# Patient Record
Sex: FEMALE | Race: WHITE | NOT HISPANIC OR LATINO | Employment: STUDENT | URBAN - METROPOLITAN AREA
[De-identification: names, ages, dates, MRNs, and addresses within clinical notes are randomized per-mention and may not be internally consistent; named-entity substitution may affect disease eponyms.]

---
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
## 2023-09-30 ENCOUNTER — HOSPITAL ENCOUNTER (OUTPATIENT)
Facility: CLINIC | Age: 23
Setting detail: OBSERVATION
Discharge: HOME | End: 2023-10-02
Attending: EMERGENCY MEDICINE | Admitting: HOSPITALIST
Payer: COMMERCIAL

## 2023-09-30 ENCOUNTER — APPOINTMENT (OUTPATIENT)
Dept: CT IMAGING | Facility: CLINIC | Age: 23
End: 2023-09-30
Attending: EMERGENCY MEDICINE
Payer: COMMERCIAL

## 2023-09-30 DIAGNOSIS — E11.9 DIABETES MELLITUS, NEW ONSET (H): ICD-10-CM

## 2023-09-30 DIAGNOSIS — R10.84 ABDOMINAL PAIN, GENERALIZED: ICD-10-CM

## 2023-09-30 LAB
ALBUMIN SERPL BCG-MCNC: 4.3 G/DL (ref 3.5–5.2)
ALBUMIN UR-MCNC: NEGATIVE MG/DL
ALP SERPL-CCNC: 79 U/L (ref 35–104)
ALT SERPL W P-5'-P-CCNC: 31 U/L (ref 0–50)
ANION GAP SERPL CALCULATED.3IONS-SCNC: 12 MMOL/L (ref 7–15)
APPEARANCE UR: CLEAR
AST SERPL W P-5'-P-CCNC: 15 U/L (ref 0–45)
B-OH-BUTYR SERPL-SCNC: <0.18 MMOL/L
BASOPHILS # BLD AUTO: 0 10E3/UL (ref 0–0.2)
BASOPHILS NFR BLD AUTO: 0 %
BILIRUB SERPL-MCNC: 0.3 MG/DL
BILIRUB UR QL STRIP: NEGATIVE
BUN SERPL-MCNC: 15.6 MG/DL (ref 6–20)
CALCIUM SERPL-MCNC: 9.6 MG/DL (ref 8.6–10)
CHLORIDE SERPL-SCNC: 101 MMOL/L (ref 98–107)
COLOR UR AUTO: ABNORMAL
CREAT SERPL-MCNC: 0.48 MG/DL (ref 0.51–0.95)
DEPRECATED HCO3 PLAS-SCNC: 21 MMOL/L (ref 22–29)
EGFRCR SERPLBLD CKD-EPI 2021: >90 ML/MIN/1.73M2
EOSINOPHIL # BLD AUTO: 0.1 10E3/UL (ref 0–0.7)
EOSINOPHIL NFR BLD AUTO: 2 %
ERYTHROCYTE [DISTWIDTH] IN BLOOD BY AUTOMATED COUNT: 12.4 % (ref 10–15)
GLUCOSE BLDC GLUCOMTR-MCNC: 259 MG/DL (ref 70–99)
GLUCOSE BLDC GLUCOMTR-MCNC: 276 MG/DL (ref 70–99)
GLUCOSE BLDC GLUCOMTR-MCNC: 282 MG/DL (ref 70–99)
GLUCOSE SERPL-MCNC: 290 MG/DL (ref 70–99)
GLUCOSE UR STRIP-MCNC: >=1000 MG/DL
HBA1C MFR BLD: 10.5 %
HCG UR QL: NEGATIVE
HCT VFR BLD AUTO: 41.3 % (ref 35–47)
HGB BLD-MCNC: 14.2 G/DL (ref 11.7–15.7)
HGB UR QL STRIP: NEGATIVE
IMM GRANULOCYTES # BLD: 0 10E3/UL
IMM GRANULOCYTES NFR BLD: 0 %
KETONES UR STRIP-MCNC: ABNORMAL MG/DL
LEUKOCYTE ESTERASE UR QL STRIP: NEGATIVE
LIPASE SERPL-CCNC: 25 U/L (ref 13–60)
LYMPHOCYTES # BLD AUTO: 3.1 10E3/UL (ref 0.8–5.3)
LYMPHOCYTES NFR BLD AUTO: 38 %
MCH RBC QN AUTO: 27.5 PG (ref 26.5–33)
MCHC RBC AUTO-ENTMCNC: 34.4 G/DL (ref 31.5–36.5)
MCV RBC AUTO: 80 FL (ref 78–100)
MONOCYTES # BLD AUTO: 0.4 10E3/UL (ref 0–1.3)
MONOCYTES NFR BLD AUTO: 5 %
NEUTROPHILS # BLD AUTO: 4.5 10E3/UL (ref 1.6–8.3)
NEUTROPHILS NFR BLD AUTO: 55 %
NITRATE UR QL: NEGATIVE
NRBC # BLD AUTO: 0 10E3/UL
NRBC BLD AUTO-RTO: 0 /100
PH UR STRIP: 5.5 [PH] (ref 5–7)
PLATELET # BLD AUTO: 192 10E3/UL (ref 150–450)
POTASSIUM SERPL-SCNC: 3.9 MMOL/L (ref 3.4–5.3)
PROT SERPL-MCNC: 7 G/DL (ref 6.4–8.3)
RBC # BLD AUTO: 5.16 10E6/UL (ref 3.8–5.2)
SODIUM SERPL-SCNC: 134 MMOL/L (ref 135–145)
SP GR UR STRIP: 1.02 (ref 1–1.03)
UROBILINOGEN UR STRIP-MCNC: NORMAL MG/DL
WBC # BLD AUTO: 8.2 10E3/UL (ref 4–11)

## 2023-09-30 PROCEDURE — 82962 GLUCOSE BLOOD TEST: CPT

## 2023-09-30 PROCEDURE — G0378 HOSPITAL OBSERVATION PER HR: HCPCS

## 2023-09-30 PROCEDURE — 250N000013 HC RX MED GY IP 250 OP 250 PS 637: Performed by: HOSPITALIST

## 2023-09-30 PROCEDURE — 83690 ASSAY OF LIPASE: CPT | Performed by: EMERGENCY MEDICINE

## 2023-09-30 PROCEDURE — 80053 COMPREHEN METABOLIC PANEL: CPT | Performed by: EMERGENCY MEDICINE

## 2023-09-30 PROCEDURE — 74177 CT ABD & PELVIS W/CONTRAST: CPT

## 2023-09-30 PROCEDURE — 250N000011 HC RX IP 250 OP 636: Mod: JZ | Performed by: EMERGENCY MEDICINE

## 2023-09-30 PROCEDURE — 96361 HYDRATE IV INFUSION ADD-ON: CPT

## 2023-09-30 PROCEDURE — 81003 URINALYSIS AUTO W/O SCOPE: CPT | Performed by: EMERGENCY MEDICINE

## 2023-09-30 PROCEDURE — 81025 URINE PREGNANCY TEST: CPT | Performed by: EMERGENCY MEDICINE

## 2023-09-30 PROCEDURE — 250N000011 HC RX IP 250 OP 636: Performed by: EMERGENCY MEDICINE

## 2023-09-30 PROCEDURE — 96374 THER/PROPH/DIAG INJ IV PUSH: CPT | Mod: 59

## 2023-09-30 PROCEDURE — 250N000009 HC RX 250: Performed by: EMERGENCY MEDICINE

## 2023-09-30 PROCEDURE — 96375 TX/PRO/DX INJ NEW DRUG ADDON: CPT

## 2023-09-30 PROCEDURE — 99222 1ST HOSP IP/OBS MODERATE 55: CPT | Performed by: HOSPITALIST

## 2023-09-30 PROCEDURE — C9113 INJ PANTOPRAZOLE SODIUM, VIA: HCPCS | Mod: JZ | Performed by: EMERGENCY MEDICINE

## 2023-09-30 PROCEDURE — 258N000003 HC RX IP 258 OP 636: Performed by: HOSPITALIST

## 2023-09-30 PROCEDURE — 82010 KETONE BODYS QUAN: CPT | Performed by: EMERGENCY MEDICINE

## 2023-09-30 PROCEDURE — 99285 EMERGENCY DEPT VISIT HI MDM: CPT | Mod: 25

## 2023-09-30 PROCEDURE — 36415 COLL VENOUS BLD VENIPUNCTURE: CPT | Performed by: EMERGENCY MEDICINE

## 2023-09-30 PROCEDURE — 85025 COMPLETE CBC W/AUTO DIFF WBC: CPT | Performed by: EMERGENCY MEDICINE

## 2023-09-30 PROCEDURE — 83036 HEMOGLOBIN GLYCOSYLATED A1C: CPT | Performed by: EMERGENCY MEDICINE

## 2023-09-30 PROCEDURE — 96360 HYDRATION IV INFUSION INIT: CPT

## 2023-09-30 PROCEDURE — 250N000013 HC RX MED GY IP 250 OP 250 PS 637: Performed by: EMERGENCY MEDICINE

## 2023-09-30 PROCEDURE — 258N000003 HC RX IP 258 OP 636: Performed by: EMERGENCY MEDICINE

## 2023-09-30 PROCEDURE — 250N000012 HC RX MED GY IP 250 OP 636 PS 637: Performed by: HOSPITALIST

## 2023-09-30 RX ORDER — ONDANSETRON 2 MG/ML
4 INJECTION INTRAMUSCULAR; INTRAVENOUS EVERY 6 HOURS PRN
Status: DISCONTINUED | OUTPATIENT
Start: 2023-09-30 | End: 2023-10-02 | Stop reason: HOSPADM

## 2023-09-30 RX ORDER — NALOXONE HYDROCHLORIDE 0.4 MG/ML
0.4 INJECTION, SOLUTION INTRAMUSCULAR; INTRAVENOUS; SUBCUTANEOUS
Status: DISCONTINUED | OUTPATIENT
Start: 2023-09-30 | End: 2023-10-02 | Stop reason: HOSPADM

## 2023-09-30 RX ORDER — IOPAMIDOL 755 MG/ML
105 INJECTION, SOLUTION INTRAVASCULAR ONCE
Status: COMPLETED | OUTPATIENT
Start: 2023-09-30 | End: 2023-09-30

## 2023-09-30 RX ORDER — NICOTINE POLACRILEX 4 MG
15-30 LOZENGE BUCCAL
Status: DISCONTINUED | OUTPATIENT
Start: 2023-09-30 | End: 2023-10-02 | Stop reason: HOSPADM

## 2023-09-30 RX ORDER — SODIUM CHLORIDE 9 MG/ML
INJECTION, SOLUTION INTRAVENOUS CONTINUOUS
Status: ACTIVE | OUTPATIENT
Start: 2023-09-30 | End: 2023-10-01

## 2023-09-30 RX ORDER — HYDROMORPHONE HCL IN WATER/PF 6 MG/30 ML
0.2 PATIENT CONTROLLED ANALGESIA SYRINGE INTRAVENOUS
Status: DISCONTINUED | OUTPATIENT
Start: 2023-09-30 | End: 2023-10-02 | Stop reason: HOSPADM

## 2023-09-30 RX ORDER — CALCIUM CITRATE/VITAMIN D3 200MG-6.25
2 TABLET ORAL DAILY
COMMUNITY

## 2023-09-30 RX ORDER — KETOROLAC TROMETHAMINE 15 MG/ML
15 INJECTION, SOLUTION INTRAMUSCULAR; INTRAVENOUS ONCE
Status: COMPLETED | OUTPATIENT
Start: 2023-09-30 | End: 2023-09-30

## 2023-09-30 RX ORDER — POLYETHYLENE GLYCOL 3350 17 G/17G
17 POWDER, FOR SOLUTION ORAL DAILY PRN
Status: DISCONTINUED | OUTPATIENT
Start: 2023-09-30 | End: 2023-10-02 | Stop reason: HOSPADM

## 2023-09-30 RX ORDER — NALOXONE HYDROCHLORIDE 0.4 MG/ML
0.2 INJECTION, SOLUTION INTRAMUSCULAR; INTRAVENOUS; SUBCUTANEOUS
Status: DISCONTINUED | OUTPATIENT
Start: 2023-09-30 | End: 2023-10-02 | Stop reason: HOSPADM

## 2023-09-30 RX ORDER — MAGNESIUM HYDROXIDE/ALUMINUM HYDROXICE/SIMETHICONE 120; 1200; 1200 MG/30ML; MG/30ML; MG/30ML
30 SUSPENSION ORAL EVERY 4 HOURS PRN
Status: DISCONTINUED | OUTPATIENT
Start: 2023-09-30 | End: 2023-10-02 | Stop reason: HOSPADM

## 2023-09-30 RX ORDER — ACETAMINOPHEN 650 MG/1
650 SUPPOSITORY RECTAL EVERY 6 HOURS PRN
Status: DISCONTINUED | OUTPATIENT
Start: 2023-09-30 | End: 2023-10-02 | Stop reason: HOSPADM

## 2023-09-30 RX ORDER — ALBUTEROL SULFATE 90 UG/1
2 AEROSOL, METERED RESPIRATORY (INHALATION) EVERY 4 HOURS PRN
Status: DISCONTINUED | OUTPATIENT
Start: 2023-09-30 | End: 2023-10-02 | Stop reason: HOSPADM

## 2023-09-30 RX ORDER — ACETAMINOPHEN 325 MG/1
650 TABLET ORAL EVERY 6 HOURS PRN
Status: DISCONTINUED | OUTPATIENT
Start: 2023-09-30 | End: 2023-10-02 | Stop reason: HOSPADM

## 2023-09-30 RX ORDER — ONDANSETRON 4 MG/1
4 TABLET, ORALLY DISINTEGRATING ORAL EVERY 6 HOURS PRN
Status: DISCONTINUED | OUTPATIENT
Start: 2023-09-30 | End: 2023-10-02 | Stop reason: HOSPADM

## 2023-09-30 RX ORDER — DEXTROSE MONOHYDRATE 25 G/50ML
25-50 INJECTION, SOLUTION INTRAVENOUS
Status: DISCONTINUED | OUTPATIENT
Start: 2023-09-30 | End: 2023-10-02 | Stop reason: HOSPADM

## 2023-09-30 RX ORDER — MAGNESIUM HYDROXIDE/ALUMINUM HYDROXICE/SIMETHICONE 120; 1200; 1200 MG/30ML; MG/30ML; MG/30ML
15 SUSPENSION ORAL ONCE
Status: COMPLETED | OUTPATIENT
Start: 2023-09-30 | End: 2023-09-30

## 2023-09-30 RX ADMIN — INSULIN ASPART 2 UNITS: 100 INJECTION, SOLUTION INTRAVENOUS; SUBCUTANEOUS at 17:33

## 2023-09-30 RX ADMIN — SODIUM CHLORIDE 1000 ML: 9 INJECTION, SOLUTION INTRAVENOUS at 07:54

## 2023-09-30 RX ADMIN — ALUMINUM HYDROXIDE, MAGNESIUM HYDROXIDE, AND SIMETHICONE 15 ML: 200; 200; 20 SUSPENSION ORAL at 08:52

## 2023-09-30 RX ADMIN — INSULIN GLARGINE 5 UNITS: 100 INJECTION, SOLUTION SUBCUTANEOUS at 10:39

## 2023-09-30 RX ADMIN — PANTOPRAZOLE SODIUM 40 MG: 40 INJECTION, POWDER, FOR SOLUTION INTRAVENOUS at 08:51

## 2023-09-30 RX ADMIN — SODIUM CHLORIDE 1000 ML: 9 INJECTION, SOLUTION INTRAVENOUS at 08:52

## 2023-09-30 RX ADMIN — IOPAMIDOL 105 ML: 755 INJECTION, SOLUTION INTRAVENOUS at 08:00

## 2023-09-30 RX ADMIN — INSULIN ASPART 2 UNITS: 100 INJECTION, SOLUTION INTRAVENOUS; SUBCUTANEOUS at 14:42

## 2023-09-30 RX ADMIN — SODIUM CHLORIDE: 9 INJECTION, SOLUTION INTRAVENOUS at 10:38

## 2023-09-30 RX ADMIN — KETOROLAC TROMETHAMINE 15 MG: 15 INJECTION, SOLUTION INTRAMUSCULAR; INTRAVENOUS at 07:52

## 2023-09-30 RX ADMIN — ACETAMINOPHEN 650 MG: 325 TABLET, FILM COATED ORAL at 14:54

## 2023-09-30 RX ADMIN — SODIUM CHLORIDE: 9 INJECTION, SOLUTION INTRAVENOUS at 20:57

## 2023-09-30 RX ADMIN — SODIUM CHLORIDE 70 ML: 9 INJECTION, SOLUTION INTRAVENOUS at 08:01

## 2023-09-30 ASSESSMENT — ACTIVITIES OF DAILY LIVING (ADL)
ADLS_ACUITY_SCORE: 33
ADLS_ACUITY_SCORE: 35
ADLS_ACUITY_SCORE: 35
ADLS_ACUITY_SCORE: 33
ADLS_ACUITY_SCORE: 35
ADLS_ACUITY_SCORE: 31
ADLS_ACUITY_SCORE: 35
ADLS_ACUITY_SCORE: 31
ADLS_ACUITY_SCORE: 33

## 2023-09-30 NOTE — ED NOTES
"[facility]  ED Nurse Handoff Report    ED Chief complaint: Abdominal Pain      ED Diagnosis:   Final diagnoses:   Diabetes mellitus, new onset (H)   Abdominal pain, generalized       Code Status: Full Code    Allergies:   Allergies   Allergen Reactions    Cats     Dogs     Seasonal Allergies        Patient Story: patient presents to ED with reports of generalized/lower abdominal discomfort/fullness and concern for constipation. She reports taking laxatives which had helped her have bowel movements but was still experiencing abdominal discomfort. Patient had labs, UA and CT scan done. CT scan unremarkable. Patient had glucose in urine and initial glucose was 290. Patient has no hx of diabetes. Abdominal pain relieved from IV Toradol.  Focused Assessment:  see above    Treatments and/or interventions provided: see MAR  Patient's response to treatments and/or interventions: pain relief    To be done/followed up on inpatient unit:  remaining inpatient orders    Does this patient have any cognitive concerns?:  NO    Activity level - Baseline/Home:  Independent  Activity Level - Current:   Independent    Patient's Preferred language: English   Needed?: No    Isolation: None  Infection: Not Applicable  Patient tested for COVID 19 prior to admission: NO  Bariatric?: No    Vital Signs:   Vitals:    09/30/23 0125 09/30/23 0754   BP: (!) 140/100 (!) 128/93   Pulse: 94 74   Resp: 16 15   Temp: 97.6  F (36.4  C)    TempSrc: Temporal    SpO2: 98% 98%   Weight: 95.3 kg (210 lb)    Height: 1.651 m (5' 5\")        Cardiac Rhythm:     Was the PSS-3 completed:   Yes  What interventions are required if any?               Family Comments: father at bedside  OBS brochure/video discussed/provided to patient/family: No              Name of person given brochure if not patient:               Relationship to patient:     For the majority of the shift this patient's behavior was Green.   Behavioral interventions " performed were .    ED NURSE PHONE NUMBER: [phone]

## 2023-09-30 NOTE — PROGRESS NOTES
RECEIVING UNIT ED HANDOFF REVIEW    ED Nurse Handoff Report was reviewed by: Deborah Ojeda RN on September 30, 2023 at 2:03 PM

## 2023-09-30 NOTE — ED PROVIDER NOTES
"    History     Chief Complaint:  Abdominal Pain       HPI   Usha Wong is a 23 year old female who presents with abdominal pain that began a week ago. The pain was in her rectum and was associated with bowel movements. She noticed bright red blood when she was wiping. The next day she noted that her pain began radiating superiorly. She reports that four days ago her abdomen felt \"really hard\" and that she began taking laxatives, specifically maximum strength up and up. Two days ago she had one bowel movement, she had two yesterday, and one around [id number] while here this morning. Her last dose of laxatives was about 2040 yesterday evening. She has taken Advil for her pain and discomfort with her last dose being yesterday evening around 2040. She reports still having abdominal pain despite her ability to have bowel movements. She has not been sleeping well because of her discomfort. She notes being stressed recently with school starting up again and that she has been cold while here. She otherwise denies fever, chills, abdominal surgeries, or prescription medications.  She denies polyuria or polydipsia.    Of note, the patient did vomit three days ago, but she believes it is unrelated and due to something she ate.    Independent Historian:    The patient provided the history noted above.    Review of External Notes:  None    Family history: Her father and grandfather have type 2 diabetes mellitus.    Medications:    Augmentin  Albuterol  Atarax  Effexor  Zofran    Past Medical History:    Depression with suicidal ideation  Anxiety  Secondary amenorrhea  Eating disorder  Insulin resistance    Past Surgical History:    Tooth extraction with forceps   Myringotomy, insert tube bilateral, combined  Tonsillectomy and adenoidectomy      Physical Exam   Patient Vitals for the past 24 hrs:   BP Temp Temp src Pulse Resp SpO2 Height Weight   09/30/23 0754 (!) 128/93 -- -- 74 15 98 % -- --   09/30/23 0125 (!) " "140/100 97.6  F (36.4  C) Temporal 94 16 98 % 1.651 m (5' 5\") 95.3 kg (210 lb)      Physical Exam  Nursing note and vitals reviewed.  Constitutional:  Appears well-developed and well-nourished.   HENT:   Head:    Atraumatic.   Mouth/Throat:   Oropharynx is clear and moist. No oropharyngeal exudate.   Eyes:    Pupils are equal, round, and reactive to light.   Neck:    Normal range of motion. Neck supple.      No tracheal deviation present. No thyromegaly present.   Cardiovascular:  Normal rate, regular rhythm, no murmur   Pulmonary/Chest: Breath sounds are clear and equal without wheezes or crackles.  Abdominal:   Effuse abdominal tenderness without rebound or guarding.      no mass. There is diffuse tenderness.      There is no rebound and no guarding.   Musculoskeletal:  Exhibits no edema.   Lymphadenopathy:  No cervical adenopathy.   Neurological:   Alert and oriented to person, place, and time.   Skin:    Skin is warm and dry. No rash noted. No pallor.     Emergency Department Course     Imaging:  CT Abdomen Pelvis w Contrast   Final Result   IMPRESSION:    1. No acute findings in the abdomen or pelvis. Normal appendix.   2. Diffuse hepatic steatosis.        Report per radiology    Laboratory:  Labs Ordered and Resulted from Time of ED Arrival to Time of ED Departure   COMPREHENSIVE METABOLIC PANEL - Abnormal       Result Value    Sodium 134 (*)     Potassium 3.9      Carbon Dioxide (CO2) 21 (*)     Anion Gap 12      Urea Nitrogen 15.6      Creatinine 0.48 (*)     GFR Estimate >90      Calcium 9.6      Chloride 101      Glucose 290 (*)     Alkaline Phosphatase 79      AST 15      ALT 31      Protein Total 7.0      Albumin 4.3      Bilirubin Total 0.3     UA MACROSCOPIC WITH REFLEX TO MICRO AND CULTURE - Abnormal    Color Urine Light Yellow      Appearance Urine Clear      Glucose Urine >=1000 (*)     Bilirubin Urine Negative      Ketones Urine Trace (*)     Specific Gravity Urine 1.020      Blood Urine Negative   "    pH Urine 5.5      Protein Albumin Urine Negative      Urobilinogen Urine Normal      Nitrite Urine Negative      Leukocyte Esterase Urine Negative     LIPASE - Normal    Lipase 25     HCG QUALITATIVE URINE - Normal    hCG Urine Qualitative Negative     CBC WITH PLATELETS AND DIFFERENTIAL    WBC Count 8.2      RBC Count 5.16      Hemoglobin 14.2      Hematocrit 41.3      MCV 80      MCH 27.5      MCHC 34.4      RDW 12.4      Platelet Count 192      % Neutrophils 55      % Lymphocytes 38      % Monocytes 5      % Eosinophils 2      % Basophils 0      % Immature Granulocytes 0      NRBCs per 100 WBC 0      Absolute Neutrophils 4.5      Absolute Lymphocytes 3.1      Absolute Monocytes 0.4      Absolute Eosinophils 0.1      Absolute Basophils 0.0      Absolute Immature Granulocytes 0.0      Absolute NRBCs 0.0        Emergency Department Course & Assessments:       Interventions:  Medications   sodium chloride 0.9% BOLUS 1,000 mL (1,000 mLs Intravenous $New Bag 9/30/23 0852)   sodium chloride 0.9% BOLUS 1,000 mL (1,000 mLs Intravenous $New Bag 9/30/23 0754)   ketorolac (TORADOL) injection 15 mg (15 mg Intravenous $Given 9/30/23 0752)   iopamidol (ISOVUE-370) solution 105 mL (105 mLs Intravenous $Given 9/30/23 0800)   Saline (70 mLs As instructed $Given 9/30/23 0801)   alum & mag hydroxide-simethicone (MAALOX) suspension 15 mL (15 mLs Oral $Given 9/30/23 0852)   pantoprazole (PROTONIX) IV push injection 40 mg (40 mg Intravenous $Given 9/30/23 0851)      Assessments:  0729 I obtained history and examined the patient as noted above.   0844 I rechecked and updated the patient.   0911 I rechecked and updated the patient on the plan to admit her to the hospital.    Independent Interpretation (X-rays, CTs, rhythm strip):  None    Consultations/Discussion of Management or Tests:  0857 I spoke with Pediatrician Dr. Martinez from [facility] about the patient's presentation, findings, and plan of care.   0907 I spoke with  hospitalist Dr. Дмитрий Wong about the patient's presentation, findings, and plan of care.        Social Determinants of Health affecting care:  Stress/Adjustment Disorders     Disposition:  The patient was admitted to the hospital under the care of Dr. Дмитрий Wong.     Impression & Plan    CMS Diagnoses: None    Medical Decision Making:  This patient arrived due to diffuse abdominal pain and feeling constipated.  CT scan of her abdomen pelvis did not show any acute abnormality.  I discussed the incidental finding of hepatic steatosis.  There was no sign of appendicitis, UTI, pregnancy, bowel obstruction, constipation.  However I did find her to have significant hyperglycemia with a glucose of 290, glucose in her urine, and a significantly elevated hemoglobin A1c of 10.5 which are concerning for new onset diabetes mellitus.  There was no sign of diabetic ketoacidosis at this time however.  She was given IV fluid hydration and admitted to the care of the hospitalist for further evaluation and treatment of her abdominal pain and new onset diabetes mellitus.    Diagnosis:    ICD-10-CM    1. Diabetes mellitus, new onset (H)  E11.9       2. Abdominal pain, generalized  R10.84            Scribe Disclosure:  Vel ESPAÑA, am serving as a scribe at 7:19 AM on 9/30/2023 to document services personally performed by Rosario Shukla MD based on my observations and the provider's statements to me.               Rosario Shukla MD  09/30/23 9626

## 2023-09-30 NOTE — PHARMACY-ADMISSION MEDICATION HISTORY
Pharmacy Intern Admission Medication History    Admission medication history is complete. The information provided in this note is only as accurate as the sources available at the time of the update.    Medication reconciliation/reorder completed by provider prior to medication history? No    Information Source(s): Patient and CareEverywhere/SureScripts via in-person    Pertinent Information:   --Pt states taking 2 max strength laxative tabs last PM, did not appear to work  --Took OTC advil last night, strength unknown    Changes made to PTA medication list:  Added: multivitamin gummy  Deleted: hydroxyzine, norethindrone-ethinyl estradiol, zofran ODT, effexor XR  Changed: None      Allergies reviewed with patient and updates made in EHR: Reviewed by RN    Medication History Completed By: EZEQUIEL RILEY 9/30/2023 11:13 AM    Prior to Admission medications    Medication Sig Last Dose Taking? Auth Provider Long Term End Date   albuterol (PROAIR HFA/PROVENTIL HFA/VENTOLIN HFA) 108 (90 Base) MCG/ACT Inhaler Inhale 2 puffs into the lungs every 4 hours as needed for shortness of breath / dyspnea or wheezing More than a month at PRN Yes Reported, Patient     Multiple Vitamins-Minerals (MULTIVITAMIN GUMMIES WOMENS) CHEW Take 2 each by mouth daily ([employer]is brand) 9/29/2023 Yes Unknown, Entered By History

## 2023-09-30 NOTE — H&P
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  Admitted 9/30/2023 with abdominal pain, bloating, hyperglycemia, and glycosuria with concerns of new onset diabetes mellitus    Hyperglycemia and glycosuria with new onset diabetes mellitus, A1C 10.5%  Abdominal pain  Nausea and vomiting  Blood streaked stool per report on week ago, single episode  Diffuse hepatic steatosis on admission CT  Admission concerns of abdominal pain, bloating and question of constipation.  CT abdomen pelvis negative.  Admission blood sugar 290.  No previous diagnosis of diabetes mellitus.  Urinalysis was significant glycosuria, >1000 mg/dL.  Clinical concerns of new onset diabetes mellitus, type II versus type I.  The differential diagnosis of new onset diabetes includes type II, type I, diseases of the exocrine pancreas, drug-induced diabetes mellitus, and other more rare causes such as myogenic diabetes.  Will need formal outpatient endocrinology consultation to determine diabetes type as well as for ongoing follow-up and management.  Positive family history of diabetes mellitus including her father and paternal grandfather as well as maternal uncle.  Blood sugar on admission 290 with hemoglobin A1c elevated, 10.5%  Admit observation   Blood sugar monitoring 4 times daily and as needed  Insulin sliding scale, low resistance scale to start, reassess daily  Start insulin glargine (Lantus) 5 units once daily; monitor and reassess daily  Consider carb counting insulin based on ongoing blood sugar monitoring  Hypoglycemia protocol  Hemoglobin A1C on admission 10.5%  Diabetes education consult for glucose monitoring, insulin administration, diet and  lifestyle modifications, counseling and education; will need close outpatient follow-up and formal clinic visit with endocrinology diabetes specialist (not available at [facility] as inpatient)  Nutrition consult  Pharmacy consult for medication review and reconciliation  Diabetic diet  IV fluids 0.9 NS, reassess daily  Antiemetics as needed for nausea and vomiting  Pain control, see hospital orders  Bowel regimen for constipation, reported prior to admission   Outpatient follow-up for blood-streaked stool x 1 one week ago prior to admission; etiology unclear, possible internal hemorrhoids with or without significant constipation; with abdominal pain endoscopic evaluation warranted such as colonoscopy; discussed with patient, AM rounding provider to also review with outpatient follow-up following treatment of diabetes mellitus of new onset  Monitor hemoglobin  Check stool Hemoccult  AM labs as ordered  Recent Labs   Lab 09/30/23  1000 09/30/23  0426   * 290*     Hyponatremia  Mild, admission sodium 134 in the setting of hyperglycemia.  Asymptomatic.  Monitor, AM basic profile    Elevated blood pressure on admission  No prior history of hypertension.  Elevated blood pressure on admission.  Monitor blood pressure in the setting of probable new onset diabetes mellitus  Lifestyle modifications including diet, exercise, weight reduction, limited sodium  Close follow-up with outpatient primary clinic provider    Obesity, past Body mass index is 34.95 kg/m .  Nutrition consult for education and counseling  Lifestyle modifications including diet, weight loss, exercise  Follow-up with primary clinic provider    History of asthma  Stable, monitor; albuterol MDI as needed as prior to admission     Disposition  Estimated length of stay 24 hours  Anticipated discharge to home  Social work consult    Change in hospitalist provider tomorrow with one of my [facility] hospitalist colleagues assuming  "care.       Diet: Moderate Consistent Carb (60 g CHO per Meal) DietDiabetic  DVT Prophylaxis: Pneumatic Compression Devices  [place] Catheter: Not present  Lines: None     Cardiac Monitoring: None  Code Status:  Full    Clinically Significant Risk Factors Present on Admission                      # DMII: A1C = 10.5 % (Ref range: <5.7 %) within past 6 months    # Obesity: Estimated body mass index is 34.95 kg/m  as calculated from the following:    Height as of this encounter: 1.651 m (5' 5\").    Weight as of this encounter: 95.3 kg (210 lb).              Disposition Plan      Expected Discharge Date: 10/01/2023                Дмитрий Wong MD  Hospitalist Service  [facility]  Securely message with [employer] (more info)  Text page via [facility] Paging/Directory     ______________________________________________________________________    Chief Complaint   Abdominal pain, bloating, hyperglycemia, glycosuria, concerns of new onset diabetes mellitus    History is obtained from the patient, ER provider, online record    History of Present Illness   Usha Wong is a 23 year old female who presents with 1 week history of intermittent abdominal discomfort and bloating with initial concerns of constipation.  1 week ago patient noted some rectal pain with small blood streak in her stool.  Over a couple of days pain progressed and became more diffuse primarily lower abdomen though later diffuse.  Felt \"really bloated\" over the past several days with episodes of nausea and vomiting.  Small bowel movement reported morning of admission and several days prior to admission.  No hematemesis.  Appetite has been stable.  Estimates 13 pound weight loss over the past several months.  No fever, chills, or sweats.  Mild to moderate headache.  No increased thirst or increased urination reported.  She does state \"I drink a lot of water\".  Family history of diabetes mellitus including her father and " paternal grandfather as well as a maternal uncle, all type II.  Patient denies prior history of diabetes mellitus.  Non-smoker.  No alcohol use.  No medications other than a multivitamin reported.    In the emergency room, elevated blood pressure, otherwise afebrile and hemodynamically stable.  Laboratory studies showed normal CBC.  Low sodium, 134 with serum creatinine 0.48.  Significant hyperglycemia, glucose 290 with hemoglobin A1c 10.5%.  CT abdomen with no acute findings, normal appendix.  Diffuse hepatic steatosis noted.  Urinalysis with significant glycosuria, greater than 1000, trace ketones.  Normal anion gap on basic metabolic profile.  Hospital admission for new onset diabetes mellitus, initiation of insulin therapy, diabetes education, and IV fluid hydration with close observation.    Past Medical History    Past Medical History:   Diagnosis Date    Anxiety     Depression     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 (H)     Uncomplicated asthma     Excercise induced       Past Surgical History   Past Surgical History:   Procedure Laterality Date    HC TOOTH EXTRACTION W/FORCEP  2017    MYRINGOTOMY, INSERT TUBE BILATERAL, COMBINED  age 2    TONSILLECTOMY  2010    and adenoidectomy       Prior to Admission Medications   Prior to Admission Medications   Prescriptions Last Dose Informant Patient Reported? Taking?   albuterol (PROAIR HFA/PROVENTIL HFA/VENTOLIN HFA) 108 (90 Base) MCG/ACT Inhaler  Self Yes No   Sig: Inhale 2 puffs into the lungs every 4 hours as needed for shortness of breath / dyspnea or wheezing   hydrOXYzine (ATARAX) 10 MG tablet   No No   Sig: Take 1 tablet (10 mg) by mouth every 8 hours as needed for anxiety   norethindrone-ethinyl estradiol (FEMHRT 1/5) 1-5 MG-MCG per tablet   Yes No   Sig: Take 1 tablet by mouth daily   ondansetron (ZOFRAN-ODT) 4 MG ODT tab   No No   Sig: Take 1 tablet (4 mg) by mouth every 6 hours as needed for nausea   venlafaxine  (EFFEXOR-XR) 150 MG 24 hr capsule   No No   Sig: Take 1 capsule (150 mg) by mouth daily (with breakfast)      Facility-Administered Medications: None        Review of Systems    The 10 point Review of Systems is negative other than noted in the HPI    Social History   I have reviewed this patient's social history and updated it with pertinent information if needed.  Social History     Tobacco Use    Smoking status: Never    Smokeless tobacco: Never   Substance Use Topics    Alcohol use: Yes     Comment: Tried it.  Last use November    Drug use: Yes     Types: Marijuana     Comment: Last use a year ago         Family History   I have reviewed this patient's family history and updated it with pertinent information if needed.  Family History   Problem Relation Age of Onset    Depression Mother         Has been hospitlaized    Anxiety Disorder Mother     Post-Traumatic Stress Disorder (PTSD) Mother     Suicide Maternal Grandfather         1990, drunk and MVA    Depression Maternal Grandfather     Alcoholism Maternal Grandfather     Suicide Maternal Aunt         April 2017    Depression Maternal Aunt          Allergies   Allergies   Allergen Reactions    Cats     Dogs     Seasonal Allergies         Physical Exam   Vital Signs: Temp: 97.6  F (36.4  C) Temp src: Temporal BP: (!) 128/93 Pulse: 74   Resp: 15 SpO2: 98 % O2 Device: None (Room air)    Weight: 210 lbs 0 oz    GENERAL awake and alert, lying in bed, her father near the bedside, in no immediate distress  EYES pupils equal, round; no conjunctival injection or jaundice  ENT mucous membranes mildly dry without lesions or exudates  LYMPH no cervical, submandibular, supraclavicular, or axillary adenopathy  BACK normal  LUNGS no wheezes or crackles  HEART S1,S2 with RRR, no rubs or gallops  ABDOMEN soft, mildly tender to palpation diffusely without no guarding, rebound, or rigidity; no palpable masses; no hepatosplenomegaly  MUSCULOSKELETAL no gross joint deformities;  extremities without edema  PULSES dorsalis pedis pulses present, symmetric  SKIN warm and dry  NEURO moves upper and lower extremities spontaneously and to command; no focal weakness appreciated; sensation intact to light touch upper and lower extremities  PSYCHIATRIC awake and alert; answers questions and follows simple commands  RECTAL no rectal fissures or external hemorrhoids with no evidence of external bleeding; ER female RN present during exam    Medical Decision Making             Data     I have personally reviewed the following data over the past 24 hrs:    8.2  \   14.2   / 192     134 (L) 101 15.6 /  259 (H)   3.9 21 (L) 0.48 (L) \     ALT: 31 AST: 15 AP: 79 TBILI: 0.3   ALB: 4.3 TOT PROTEIN: 7.0 LIPASE: 25     TSH: N/A T4: N/A A1C: 10.5 (H)     Imaging results reviewed over the past 24 hrs:   Recent Results (from the past 24 hour(s))   CT Abdomen Pelvis w Contrast    Narrative    EXAM: CT ABDOMEN PELVIS W CONTRAST  LOCATION: [facility]  DATE: 9/30/2023    INDICATION: Abdominal pain and constipation for one week. Assess for Appendicitis, Colitis, constipation, diverticulitis.  COMPARISON: None.  TECHNIQUE: CT scan of the abdomen and pelvis was performed following injection of IV contrast. Multiplanar reformats were obtained. Dose reduction techniques were used.  CONTRAST: 105mL Isovue 370    FINDINGS:   LOWER CHEST: Normal.    HEPATOBILIARY: Diffuse hepatic steatosis. No focal liver lesion. Normal gallbladder.    PANCREAS: Normal.    SPLEEN: Normal.    ADRENAL GLANDS: Normal.    KIDNEYS/BLADDER: Small benign simple left renal cyst, no follow-up needed. Otherwise unremarkable. No urinary stone or hydronephrosis. Normal symmetric renal parenchymal enhancement    BOWEL: Normal. No acute findings. No evidence of diverticulitis or colitis. Normal appendix. No free air, free fluid or abscess. No evidence of bowel obstruction. Small volume stool scattered in the colon.    LYMPH NODES:  Normal.    VASCULATURE: Unremarkable.    PELVIC ORGANS: Physiologic right ovarian cyst. No free fluid.    MUSCULOSKELETAL: Normal.      Impression    IMPRESSION:   1. No acute findings in the abdomen or pelvis. Normal appendix.  2. Diffuse hepatic steatosis.

## 2023-09-30 NOTE — ED TRIAGE NOTES
Patient presents with complaints of diffuse abdominal pain, constipation, and bloating for the past week. Patient has been taking laxatives without any results.      Triage Assessment       Row Name 09/30/23 0123       Triage Assessment (Adult)    Airway WDL WDL       Respiratory WDL    Respiratory WDL WDL       Skin Circulation/Temperature WDL    Skin Circulation/Temperature WDL WDL       Cardiac WDL    Cardiac WDL WDL       Peripheral/Neurovascular WDL    Peripheral Neurovascular WDL WDL       Cognitive/Neuro/Behavioral WDL    Cognitive/Neuro/Behavioral WDL WDL

## 2023-09-30 NOTE — PROGRESS NOTES
"PRIMARY Concern: Abdominal pain, new diagnose of diabetes, hyperglycemia  SAFETY RISK Concerns (fall risk, behaviors, etc.): none      Tests/Procedures for NEXT shift: Need stool sample  Consults? (Pending/following, signed-off?) Nutrition, SW consult pending.   Where is patient from? (Home, TCU, etc.): Home  Other Important info for NEXT shift: monitor BG. Diabetic education booklet given & needs reassurance with education  Anticipated DC date & active delays: TBD  _______________________________________________________________________  SUMMARY NOTE:             Orientation/Cognitive: A&Ox4  Observation Goals (Met/ Not Met): not met  Mobility Level/Assist Equipment: Ind  Antibiotics & Plan (IV/po, length of tx left): N/A  Pain Management: PRN-Tylenol  Tele/VS/O2: VSS on RA. No tele  ABNL Lab/BG: Na- 134. A1c- 10.5  Diet: Mod carb   Bowel/Bladder: Cont  Skin Concerns: none ex tattoo's  Drains/Devices: NS infusing 100 mL/hr  Patient Stated Goal for Today: \"This is so overwhelming\"        "

## 2023-09-30 NOTE — PROVIDER NOTIFICATION
MD Notification    Notified Person: MD    Notified Person Name: Dr. Wong    Notification Date/Time: 3:10 PM 09/30/23     Notification Interaction: Vocera    Purpose of Notification: pt here with newly dx diabetes. originally came in for abdominal pain. does not feel like abd pain is improving. ct negative. toradol and maalox given in ed with relief. now pain is back. could you order Maalox possibly scheduled iv protonix? pain is 4/10 pressure like pain. can't sleep because she can't get comfortable. will try Tylenol for now.    Orders Received: awaiting response.     Comments: Did educate patient that diabetes can cause GI complications, diabetic educator consult pending. Stool sample pending.

## 2023-10-01 LAB
ANION GAP SERPL CALCULATED.3IONS-SCNC: 12 MMOL/L (ref 7–15)
BASOPHILS # BLD AUTO: 0 10E3/UL (ref 0–0.2)
BASOPHILS NFR BLD AUTO: 0 %
BUN SERPL-MCNC: 7.9 MG/DL (ref 6–20)
CALCIUM SERPL-MCNC: 9 MG/DL (ref 8.6–10)
CHLORIDE SERPL-SCNC: 102 MMOL/L (ref 98–107)
CREAT SERPL-MCNC: 0.47 MG/DL (ref 0.51–0.95)
DEPRECATED HCO3 PLAS-SCNC: 22 MMOL/L (ref 22–29)
EGFRCR SERPLBLD CKD-EPI 2021: >90 ML/MIN/1.73M2
EOSINOPHIL # BLD AUTO: 0.1 10E3/UL (ref 0–0.7)
EOSINOPHIL NFR BLD AUTO: 2 %
ERYTHROCYTE [DISTWIDTH] IN BLOOD BY AUTOMATED COUNT: 12.6 % (ref 10–15)
GLUCOSE BLDC GLUCOMTR-MCNC: 216 MG/DL (ref 70–99)
GLUCOSE BLDC GLUCOMTR-MCNC: 218 MG/DL (ref 70–99)
GLUCOSE BLDC GLUCOMTR-MCNC: 234 MG/DL (ref 70–99)
GLUCOSE BLDC GLUCOMTR-MCNC: 247 MG/DL (ref 70–99)
GLUCOSE BLDC GLUCOMTR-MCNC: 262 MG/DL (ref 70–99)
GLUCOSE BLDC GLUCOMTR-MCNC: 269 MG/DL (ref 70–99)
GLUCOSE SERPL-MCNC: 231 MG/DL (ref 70–99)
HCT VFR BLD AUTO: 40.3 % (ref 35–47)
HEMOCCULT STL QL: NEGATIVE
HGB BLD-MCNC: 13.8 G/DL (ref 11.7–15.7)
IMM GRANULOCYTES # BLD: 0 10E3/UL
IMM GRANULOCYTES NFR BLD: 1 %
LYMPHOCYTES # BLD AUTO: 2.5 10E3/UL (ref 0.8–5.3)
LYMPHOCYTES NFR BLD AUTO: 41 %
MCH RBC QN AUTO: 27.8 PG (ref 26.5–33)
MCHC RBC AUTO-ENTMCNC: 34.2 G/DL (ref 31.5–36.5)
MCV RBC AUTO: 81 FL (ref 78–100)
MONOCYTES # BLD AUTO: 0.3 10E3/UL (ref 0–1.3)
MONOCYTES NFR BLD AUTO: 5 %
NEUTROPHILS # BLD AUTO: 3.2 10E3/UL (ref 1.6–8.3)
NEUTROPHILS NFR BLD AUTO: 51 %
NRBC # BLD AUTO: 0 10E3/UL
NRBC BLD AUTO-RTO: 0 /100
PLATELET # BLD AUTO: 158 10E3/UL (ref 150–450)
POTASSIUM SERPL-SCNC: 3.7 MMOL/L (ref 3.4–5.3)
RBC # BLD AUTO: 4.96 10E6/UL (ref 3.8–5.2)
SODIUM SERPL-SCNC: 136 MMOL/L (ref 135–145)
WBC # BLD AUTO: 6.2 10E3/UL (ref 4–11)

## 2023-10-01 PROCEDURE — 258N000003 HC RX IP 258 OP 636: Performed by: HOSPITALIST

## 2023-10-01 PROCEDURE — 99233 SBSQ HOSP IP/OBS HIGH 50: CPT | Performed by: INTERNAL MEDICINE

## 2023-10-01 PROCEDURE — 250N000011 HC RX IP 250 OP 636: Mod: JZ | Performed by: HOSPITALIST

## 2023-10-01 PROCEDURE — 82310 ASSAY OF CALCIUM: CPT | Performed by: HOSPITALIST

## 2023-10-01 PROCEDURE — 85025 COMPLETE CBC W/AUTO DIFF WBC: CPT | Performed by: HOSPITALIST

## 2023-10-01 PROCEDURE — 82962 GLUCOSE BLOOD TEST: CPT

## 2023-10-01 PROCEDURE — 96376 TX/PRO/DX INJ SAME DRUG ADON: CPT

## 2023-10-01 PROCEDURE — 36415 COLL VENOUS BLD VENIPUNCTURE: CPT | Performed by: HOSPITALIST

## 2023-10-01 PROCEDURE — 250N000013 HC RX MED GY IP 250 OP 250 PS 637: Performed by: HOSPITALIST

## 2023-10-01 PROCEDURE — 96361 HYDRATE IV INFUSION ADD-ON: CPT

## 2023-10-01 PROCEDURE — G0378 HOSPITAL OBSERVATION PER HR: HCPCS

## 2023-10-01 PROCEDURE — C9113 INJ PANTOPRAZOLE SODIUM, VIA: HCPCS | Mod: JZ | Performed by: HOSPITALIST

## 2023-10-01 PROCEDURE — 82272 OCCULT BLD FECES 1-3 TESTS: CPT | Performed by: HOSPITALIST

## 2023-10-01 RX ADMIN — PANTOPRAZOLE SODIUM 40 MG: 40 INJECTION, POWDER, FOR SOLUTION INTRAVENOUS at 08:21

## 2023-10-01 RX ADMIN — ACETAMINOPHEN 650 MG: 325 TABLET, FILM COATED ORAL at 00:15

## 2023-10-01 RX ADMIN — INSULIN ASPART 2 UNITS: 100 INJECTION, SOLUTION INTRAVENOUS; SUBCUTANEOUS at 12:57

## 2023-10-01 RX ADMIN — INSULIN ASPART 1 UNITS: 100 INJECTION, SOLUTION INTRAVENOUS; SUBCUTANEOUS at 18:54

## 2023-10-01 RX ADMIN — INSULIN ASPART 1 UNITS: 100 INJECTION, SOLUTION INTRAVENOUS; SUBCUTANEOUS at 08:13

## 2023-10-01 RX ADMIN — SODIUM CHLORIDE: 9 INJECTION, SOLUTION INTRAVENOUS at 07:03

## 2023-10-01 RX ADMIN — INSULIN GLARGINE 5 UNITS: 100 INJECTION, SOLUTION SUBCUTANEOUS at 08:14

## 2023-10-01 ASSESSMENT — ACTIVITIES OF DAILY LIVING (ADL)
ADLS_ACUITY_SCORE: 31

## 2023-10-01 NOTE — PROGRESS NOTES
Observation goals  PRIOR TO DISCHARGE        Comments:   -diagnostic tests and consults completed and resulted- not met  -vital signs normal or at patient baseline-not met  -tolerating oral intake to maintain hydration- met  -adequate pain control on oral analgesics- partially met  -returns to baseline functional status- not met  -safe disposition plan has been identified- partially met  -education for diabetes completed and able to manage medications and testing at home

## 2023-10-01 NOTE — PROGRESS NOTES
PRIMARY Concern: Abdominal pain, new diagnosis of diabetes, hyperglycemia  SAFETY RISK Concerns (fall risk, behaviors, etc.): none      Tests/Procedures for NEXT shift: Need stool sample  Consults? (Pending/following, signed-off?) Nutrition, SW consult pending.   Where is patient from? (Home, TCU, etc.): Home  Other Important info for NEXT shift: monitor BG. Diabetic education booklet given & needs reassurance with education  Anticipated DC date & active delays: TBD  _______________________________________________________________________  SUMMARY NOTE:             Orientation/Cognitive: A&Ox4  Observation Goals (Met/ Not Met): not met  Mobility Level/Assist Equipment: Ind  Antibiotics & Plan (IV/po, length of tx left): N/A  Pain Management: PRN-Tylenol. Heating pack.  Tele/VS/O2: VSS on RA. No tele  ABNL Lab/BG: BG- 282, 264. Na- 134. A1c- 10.5  Diet: Mod carb, carb counting.  Bowel/Bladder: Continent. No BM this shift.  Skin Concerns: none ex tattoo's  Drains/Devices: NS infusing 100 mL/hr  Patient Stated Goal for Today:

## 2023-10-01 NOTE — PLAN OF CARE
Goal Outcome Evaluation:    PRIMARY Concern: Abdominal pain, new diagnosis of diabetes, hyperglycemia  SAFETY RISK Concerns (fall risk, behaviors, etc.): none      Tests/Procedures for NEXT shift: BM x1 this shift, gave stool sample; Occult Blood Stool-Negative  Consults? (Pending/following, signed-off?) Nutrition, SW consult pending.   Where is patient from? (Home, TCU, etc.): Home  Other Important info for NEXT shift: monitor BG   Anticipated DC date & active delays: TBD     SUMMARY NOTE:             Orientation/Cognitive: A&Ox4  Observation Goals (Met/ Not Met): not met  Mobility Level/Assist Equipment: Ind  Antibiotics & Plan (IV/po, length of tx left): N/A  Pain Management: Denies any pain/discomfort  Tele/VS/O2: VSS except HTNsive, on RA. No tele  ABNL Lab/BG: BG-234, 226 & 218. Cr-0.47  Diet: Mod carb, carb counting.  Bowel/Bladder: Continent. BM x1 (soft, brown)  Skin Concerns: none ex tattoo's  Drains/Devices: PIV-SL  Patient Stated Goal for Today: Rest and Diabetes education    Some Diabetes education provided to pt via printouts (Insulin pen administration-Video, diabetic lifestyle), demonstration (insulin administration) and explanation (blood glucose-fingerstick).  Father at bedside.

## 2023-10-01 NOTE — PROGRESS NOTES
Observation goals  PRIOR TO DISCHARGE        Comments: -diagnostic tests and consults completed and resulted- not  -vital signs normal or at patient baseline-not met  -tolerating oral intake to maintain hydration- met  -adequate pain control on oral analgesics- partially met  -returns to baseline functional status- not met  -safe disposition plan has been identified- partially met  -education for diabetes completed and able to manage medications and testing at home

## 2023-10-01 NOTE — PROGRESS NOTES
PRIMARY Concern: Abdominal pain, new diagnosis of diabetes, hyperglycemia  SAFETY RISK Concerns (fall risk, behaviors, etc.): none      Tests/Procedures for NEXT shift: Need stool sample, No BM this shift  Consults? (Pending/following, signed-off?) Nutrition, SW consult pending.   Where is patient from? (Home, TCU, etc.): Home  Other Important info for NEXT shift: monitor BG   Anticipated DC date & active delays: TBD    SUMMARY NOTE:             Orientation/Cognitive: A&Ox4  Observation Goals (Met/ Not Met): not met  Mobility Level/Assist Equipment: Ind  Antibiotics & Plan (IV/po, length of tx left): N/A  Pain Management: PRN-Tylenol. Heating pack.  Tele/VS/O2: VSS on RA. No tele  ABNL Lab/BG: BG- 216  Diet: Mod carb, carb counting.  Bowel/Bladder: Continent.   Skin Concerns: none ex tattoo's  Drains/Devices: NS infusing 100 mL/hr  Patient Stated Goal for Today:             Observation goals  PRIOR TO DISCHARGE        Comments: -diagnostic tests and consults completed and resulted- not  -Vital signs normal or at patient baseline-not met  -Tolerating oral intake to maintain hydration- met  -Adequate pain control on oral analgesics- partially met  -Returns to baseline functional status- not met  -Safe disposition plan has been identified- partially met  -education for diabetes completed and able to manage medications and testing at home

## 2023-10-01 NOTE — PROGRESS NOTES
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  Admitted 9/30/2023 with abdominal pain, bloating, hyperglycemia, and glycosuria with concerns of new onset diabetes mellitus    Hyperglycemia and glycosuria with new onset diabetes mellitus, A1C 10.5%  Abdominal pain is improving now  Nausea and vomiting  Blood streaked stool per report on week ago, single episode  Diffuse hepatic steatosis on admission CT      Admission concerns of abdominal pain, bloating and question of constipation.    CT abdomen pelvis negative.  Admission blood sugar 290.    No previous diagnosis of diabetes mellitus.    Urinalysis was significant glycosuria, >1000 mg/dL.    Clinical concerns of new onset diabetes mellitus, type II versus type I.  The differential diagnosis of new onset diabetes includes type II, type I, diseases of the exocrine pancreas, drug-induced diabetes mellitus, and other more rare causes such as myogenic diabetes.  Will need formal outpatient endocrinology consultation to determine diabetes type as well as for ongoing follow-up and management.  Positive family history of diabetes mellitus including her father and paternal grandfather as well as maternal uncle.  Blood sugar on admission 290 with hemoglobin A1c elevated, 10.5%  Admit observation   Blood sugar monitoring 4 times daily and as needed  Insulin sliding scale, low resistance scale to start, reassess daily  Start insulin glargine (Lantus) 5 units once daily; I increase it to 8 units  Consider carb counting insulin based on ongoing blood sugar monitoring  Hypoglycemia protocol  Hemoglobin A1C on admission 10.5%  Nutrition consult  Diabetic diet  IV fluids 0.9 NS, reassess  daily  Antiemetics as needed for nausea and vomiting  Outpatient follow-up for blood-streaked stool x 1 one week ago prior to admission; etiology unclear, possible internal hemorrhoids with or without significant constipation; with abdominal pain endoscopic evaluation warranted such as   Monitor hemoglobin  Hemoccult test is negative  I had a lengthy discussion with the patient about management of diabetes she is under observation so diabetic educator cannot see her during this admission  In charge nurse mentioned they will arrange our nutritionist to discuss with her about the diet  Staff will teach her about insulin injection  She needs very close follow-up with her primary care doctor and endocrinologist  She will benefit by diabetic educator follow-up as outpatient  Her father was present and he had so many questions which were answered  Patient can be discharged tomorrow  On Lantus insulin 8 units and  Regular insulin 3-4  units before each meal  Will need adjustment by her PCP or her endocrinologist   Because the importance of following with them very closely      Hyponatremia  Improved with IV hydration    Elevated blood pressure on admission  No prior history of hypertension.  Elevated blood pressure on admission.  Monitor blood pressure in the setting of probable new onset diabetes mellitus  Lifestyle modifications including diet, exercise, weight reduction, limited sodium  Close follow-up with outpatient primary clinic provider    Obesity, past Body mass index is 34.95 kg/m .  Nutrition consult for education and counseling  Lifestyle modifications including diet, weight loss, exercise  Follow-up with primary clinic provider  She may benefit with GLP-1    History of asthma  Stable, monitor; albuterol MDI as needed as prior to admission     Disposition  Most likely she will be able to go home tomorrow she could not be discharged today as she was so overwhelmed pain improved but did not resolve  "completely  Anticipated discharge to home  Social work consult    Care was discussed with the patient and her RN  Her father was also present at bedside and had so many questions  Answered all the questions       Diet: Moderate Consistent Carb (60 g CHO per Meal) Diet    DVT Prophylaxis: Low Risk/Ambulatory with no VTE prophylaxis indicated  [place] Catheter: Not present  Lines: None     Cardiac Monitoring: None  Code Status: Full Code      Clinically Significant Risk Factors Present on Admission                      # DMII: A1C = 10.5 % (Ref range: <5.7 %) within past 6 months    # Obesity: Estimated body mass index is 34.95 kg/m  as calculated from the following:    Height as of this encounter: 1.651 m (5' 5\").    Weight as of this encounter: 95.3 kg (210 lb).              Disposition Plan      Expected Discharge Date: 10/02/2023        Discharge Comments:   New diabetic--will need diabetic educations and supplies   Not ready for discharge today  She is overwhelmed   Would keep her for one more day             Tatiana Manrique MD  Hospitalist Service  [facility]  Securely message with [employer] (more info)  Text page via [facility] Paging/Directory   ______________________________________________________________________    Interval History   Pain has improved   She is overwhelmed with new diagnosis  Dad was present and has several questions     Physical Exam   Vital Signs: Temp: 97.8  F (36.6  C) Temp src: Oral BP: (!) 139/93 Pulse: 86   Resp: 18 SpO2: 98 % O2 Device: None (Room air)    Weight: 210 lbs 0 oz    She is very nice and pleasant.  She is comfortable and not in any kind of distress.  She is fully alert awake oriented.      Medical Decision Making       50 MINUTES SPENT BY ME on the date of service doing chart review, history, exam, documentation & further activities per the note.      Data     I have personally reviewed the following data over the past 24 hrs:    6.2  \   13.8   / 158 "     136 102 7.9 /  231 (H)   3.7 22 0.47 (L) \

## 2023-10-02 VITALS
TEMPERATURE: 97.9 F | SYSTOLIC BLOOD PRESSURE: 139 MMHG | BODY MASS INDEX: 37.02 KG/M2 | WEIGHT: 222.2 LBS | OXYGEN SATURATION: 98 % | HEIGHT: 65 IN | DIASTOLIC BLOOD PRESSURE: 94 MMHG | RESPIRATION RATE: 20 BRPM | HEART RATE: 89 BPM

## 2023-10-02 LAB
GLUCOSE BLDC GLUCOMTR-MCNC: 190 MG/DL (ref 70–99)
GLUCOSE BLDC GLUCOMTR-MCNC: 224 MG/DL (ref 70–99)

## 2023-10-02 PROCEDURE — G0378 HOSPITAL OBSERVATION PER HR: HCPCS

## 2023-10-02 PROCEDURE — 82962 GLUCOSE BLOOD TEST: CPT

## 2023-10-02 PROCEDURE — 99239 HOSP IP/OBS DSCHRG MGMT >30: CPT | Performed by: NURSE PRACTITIONER

## 2023-10-02 PROCEDURE — 250N000011 HC RX IP 250 OP 636: Mod: JZ | Performed by: HOSPITALIST

## 2023-10-02 PROCEDURE — C9113 INJ PANTOPRAZOLE SODIUM, VIA: HCPCS | Mod: JZ | Performed by: HOSPITALIST

## 2023-10-02 PROCEDURE — 96376 TX/PRO/DX INJ SAME DRUG ADON: CPT

## 2023-10-02 RX ORDER — INSULIN GLARGINE 100 [IU]/ML
8 INJECTION, SOLUTION SUBCUTANEOUS EVERY MORNING
Qty: 15 ML | Refills: 0 | Status: SHIPPED | OUTPATIENT
Start: 2023-10-02 | End: 2024-01-11

## 2023-10-02 RX ORDER — SIMETHICONE 80 MG
80 TABLET,CHEWABLE ORAL EVERY 6 HOURS PRN
Status: DISCONTINUED | OUTPATIENT
Start: 2023-10-02 | End: 2023-10-02 | Stop reason: HOSPADM

## 2023-10-02 RX ORDER — SIMETHICONE 80 MG
80 TABLET,CHEWABLE ORAL EVERY 6 HOURS PRN
Qty: 120 TABLET | Refills: 0 | Status: SHIPPED | OUTPATIENT
Start: 2023-10-02

## 2023-10-02 RX ADMIN — PANTOPRAZOLE SODIUM 40 MG: 40 INJECTION, POWDER, FOR SOLUTION INTRAVENOUS at 08:47

## 2023-10-02 RX ADMIN — INSULIN ASPART 1 UNITS: 100 INJECTION, SOLUTION INTRAVENOUS; SUBCUTANEOUS at 14:16

## 2023-10-02 RX ADMIN — INSULIN ASPART 1 UNITS: 100 INJECTION, SOLUTION INTRAVENOUS; SUBCUTANEOUS at 08:47

## 2023-10-02 ASSESSMENT — ACTIVITIES OF DAILY LIVING (ADL)
ADLS_ACUITY_SCORE: 31
DEPENDENT_IADLS:: INDEPENDENT

## 2023-10-02 NOTE — PLAN OF CARE
Goal Outcome Evaluation:      Plan of Care Reviewed With: patient  PRIMARY Concern: Abdominal pain and high glucose  SAFETY RISK Concerns (fall risk, behaviors, etc.): None      Tests/Procedures for NEXT shift: None  Consults? (Pending/following, signed-off?) None  Where is patient from? (Home, TCU, etc.): Home  Other Important info for NEXT shift: None  Anticipated DC date & active delays: Today  ____________________________________________________________________  SUMMARY NOTE:  Orientation/Cognitive: AOx4  Observation Goals (Met/ Not Met): Met  Mobility Level/Assist Equipment: IND  Pain Management: Denies  Tele/VS/O2: VSS on RA  ABNL Lab/BG:  and 219  Diet: Mod carb  Bowel/Bladder: Continent  Skin Concerns: None  Drains/Devices: PIV removed  Patient Stated Goal for Today: Discharge to home  Diabetes medication and AVS reviewed with patient, all questions answered. PIV removed. All belongings gathered. Discharged off the unit.

## 2023-10-02 NOTE — DISCHARGE SUMMARY
"[facility]  Hospitalist Discharge Summary      Date of Admission:  9/30/2023  Date of Discharge:  10/2/2023  Discharging Provider: Justine Moran CNP  Discharge Service: Hospitalist Service    Discharge Diagnoses   # New onset diabetes mellitus  # Hyperglycemia  # Glucosuria  # Abdominal pain  # Nausea and vomiting  # Isolated episode of blood-streaked stool  # Diffuse hepatic steatosis on CT    Clinically Significant Risk Factors     # DMII: A1C = 10.5 % (Ref range: <5.7 %) within past 6 months  # Obesity: Estimated body mass index is 36.98 kg/m  as calculated from the following:    Height as of this encounter: 1.651 m (5' 5\").    Weight as of this encounter: 100.8 kg (222 lb 3.2 oz).       Follow-ups Needed After Discharge   - You will need to follow up with your PCP in 5-7 days due to new diabetes mellitus diagnosis   - We recommend follow up with Endocrinology and Diabetes Educator     Unresulted Labs Ordered in the Past 30 Days of this Admission       No orders found from 8/31/2023 to 10/1/2023.            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 Admitted 9/30/2023 with abdominal pain, bloating, hyperglycemia, and glycosuria with concerns of new onset diabetes mellitus.    # New onset diabetes mellitus  # Hyperglycemia  # Glucosuria  Blood glucose 290 on admission.  Hemoglobin A1c 10.5%.  UA with significant glycosuria greater than 1000 megs per deciliter.  Provided additional teaching, including signs of hypoglycemia.  Endocrine referral placed.  Diabetes educator referral placed.  Likely needs further work-up to determine if type I late onset versus type II.  Discharged with Lantus 8 units every morning, carb coverage with insulin " aspart 1 unit per 15 g carbs, and low intensity sliding scale insulin at mealtimes.  Discharged with supplies including glucometer, lancets, test strips, needles and wipes.    # Abdominal pain  # Nausea and vomiting  Improving a bit, but still c/o nausea and abdominal pain.  Discussed possibility of IBS.  Recommended to see her PCP for further workup.  Discharged with simethicone Q6H prn.     # Isolated episode of blood-streaked stool  No further issues.  Had issues with constipation/diarrhea cycling before admission .     # Diffuse hepatic steatosis on CT  Liver chemistries wnl this admission.      Consultations This Hospital Stay   CARE MANAGEMENT / SOCIAL WORK IP CONSULT  NUTRITION SERVICES ADULT IP CONSULT  DIABETES EDUCATION IP CONSULT    Code Status   Full Code    Time Spent on this Encounter   I, Justine Moran CNP, personally saw the patient today and spent greater than 30 minutes discharging this patient.       Justine Moran CNP  [facility] EXTENDED RECOVERY AND SHORT STAY  [address] [address]  Phone: [phone]  ______________________________________________________________________    Physical Exam   Vital Signs: Temp: 97.9  F (36.6  C) Temp src: Oral BP: (!) 139/94 Pulse: 89   Resp: 20 SpO2: 98 % O2 Device: None (Room air)    Weight: 222 lbs 3.2 oz      GENERAL: Alert and oriented x 3. Well nourished, well developed.  Obese body habitus.  No acute distress.    HEENT: Normocephalic, atraumatic. Anicteric sclera. Mucous membranes moist.   CV: RRR. S1, S2. No murmurs appreciated.   RESPIRATORY: Effort normal on room air. Lungs CTAB with no wheezing, rales, or rhonchi.   GI: Abdomen soft and non distended, bowel sounds present x all 4 quadrants. No tenderness, rebound, or guarding.   NEUROLOGICAL: No focal deficits. Follows commands.  Strength equal in upper and lower extremities.   MUSCULOSKELETAL: No joint swelling or tenderness. Moves all extremities.    EXTREMITIES: No gross deformities. No peripheral edema.   SKIN: Grossly warm, dry, and intact. No jaundice. No rashes.          Primary Care Physician   Jose Gonsales    Discharge Orders   No discharge procedures on file.    Significant Results and Procedures   Most Recent 3 CBC's:  Recent Labs   Lab Test 10/01/23  0750 09/30/23  0426 01/20/19  1555   WBC 6.2 8.2 8.7   HGB 13.8 14.2 13.2   MCV 81 80 77    192 205     Most Recent 3 BMP's:  Recent Labs   Lab Test 10/02/23  0213 10/01/23  2209 10/01/23  1756 10/01/23  1244 10/01/23  0750 09/30/23  1000 09/30/23  0426 01/20/19  1555   0000   NA  --   --   --   --  136  --  134* 141  --    POTASSIUM  --   --   --   --  3.7  --  3.9 4.0  --    CHLORIDE  --   --   --   --  102  --  101 106  --    CO2  --   --   --   --  22  --  21* 25  --    BUN  --   --   --   --  7.9  --  15.6 9  --    CR  --   --   --   --  0.47*  --  0.48* 0.58  --    ANIONGAP  --   --   --   --  12  --  12 10  --    JAVIER  --   --   --   --  9.0  --  9.6 9.3  --    * 247* 218*   < > 231*   < > 290* 77   < >    < > = values in this interval not displayed.     Most Recent 2 LFT's:  Recent Labs   Lab Test 09/30/23 0426 01/20/19  1555   AST 15 22   ALT 31 34   ALKPHOS 79 76   BILITOTAL 0.3 0.2     Most Recent 3 INR's:No lab results found.,   Results for orders placed or performed during the hospital encounter of 09/30/23   CT Abdomen Pelvis w Contrast    Narrative    EXAM: CT ABDOMEN PELVIS W CONTRAST  LOCATION: [facility]  DATE: 9/30/2023    INDICATION: Abdominal pain and constipation for one week. Assess for Appendicitis, Colitis, constipation, diverticulitis.  COMPARISON: None.  TECHNIQUE: CT scan of the abdomen and pelvis was performed following injection of IV contrast. Multiplanar reformats were obtained. Dose reduction techniques were used.  CONTRAST: 105mL Isovue 370    FINDINGS:   LOWER CHEST: Normal.    HEPATOBILIARY: Diffuse hepatic steatosis. No  focal liver lesion. Normal gallbladder.    PANCREAS: Normal.    SPLEEN: Normal.    ADRENAL GLANDS: Normal.    KIDNEYS/BLADDER: Small benign simple left renal cyst, no follow-up needed. Otherwise unremarkable. No urinary stone or hydronephrosis. Normal symmetric renal parenchymal enhancement    BOWEL: Normal. No acute findings. No evidence of diverticulitis or colitis. Normal appendix. No free air, free fluid or abscess. No evidence of bowel obstruction. Small volume stool scattered in the colon.    LYMPH NODES: Normal.    VASCULATURE: Unremarkable.    PELVIC ORGANS: Physiologic right ovarian cyst. No free fluid.    MUSCULOSKELETAL: Normal.      Impression    IMPRESSION:   1. No acute findings in the abdomen or pelvis. Normal appendix.  2. Diffuse hepatic steatosis.       Discharge Medications   Current Discharge Medication List        START taking these medications    Details   blood glucose (NO BRAND SPECIFIED) lancets standard Use to test blood sugar 4 times daily or as directed.  Qty: 200 each, Refills: 0    Associated Diagnoses: Diabetes mellitus, new onset (H)      blood glucose (NO BRAND SPECIFIED) test strip Use to test blood sugar 4 times daily or as directed.  Qty: 200 strip, Refills: 0    Comments: Per insurance  Associated Diagnoses: Diabetes mellitus, new onset (H)      blood glucose monitoring (NO BRAND SPECIFIED) meter device kit Use to test blood sugar as directed.  Qty: 1 kit, Refills: 0    Associated Diagnoses: Diabetes mellitus, new onset (H)           CONTINUE these medications which have NOT CHANGED    Details   albuterol (PROAIR HFA/PROVENTIL HFA/VENTOLIN HFA) 108 (90 Base) MCG/ACT Inhaler Inhale 2 puffs into the lungs every 4 hours as needed for shortness of breath / dyspnea or wheezing      Multiple Vitamins-Minerals (MULTIVITAMIN GUMMIES WOMENS) CHEW Take 2 each by mouth daily (Ellis brand)           Allergies   Allergies   Allergen Reactions    Cats     Dogs     Seasonal Allergies

## 2023-10-02 NOTE — PROGRESS NOTES
PRIMARY Concern: Abdominal pain, new diagnosis of diabetes, hyperglycemia  SAFETY RISK Concerns (fall risk, behaviors, etc.): none      Tests/Procedures for NEXT shift: none  Consults? (Pending/following, signed-off?) Nutrition consult pending.  Where is patient from? (Home, TCU, etc.): Home  Other Important info for NEXT shift: monitor BG Q4  Anticipated DC date & active delays: tomorrow 10/2 and outpatient follow up with endocrinology.    SUMMARY NOTE:             Orientation/Cognitive: A&Ox4  Observation Goals (Met/ Not Met): not met  Mobility Level/Assist Equipment: Ind  Antibiotics & Plan (IV/po, length of tx left): N/A  Pain Management: denies pain.  Tele/VS/O2: VSS on RA. No tele  ABNL Lab/BG: BG- 247. A1c- 10.5  Diet: Mod carb, carb counting.  Bowel/Bladder: Continent.   Skin Concerns: none ex tattoo's  Drains/Devices: PIV SL  Patient Stated Goal for Today:

## 2023-10-02 NOTE — PLAN OF CARE
Goal Outcome Evaluation:         PRIMARY Concern: Abdominal pain, new diagnosis of diabetes, hyperglycemia  SAFETY RISK Concerns (fall risk, behaviors, etc.): none      Tests/Procedures for NEXT shift: none  Consults? (Pending/following, signed-off?) Nutrition consult  Where is patient from? (Home, TCU, etc.): Home  Other Important info for NEXT shift: discharge planned  Anticipated DC date & active delays: today 10/2 and outpatient follow up with endocrinology.    SUMMARY NOTE: 10-2-23, [id number]             Orientation/Cognitive: A& O x4  Observation Goals (Met/ Not Met): met  Mobility Level/Assist Equipment: Ind  Antibiotics & Plan (IV/po, length of tx left): N/A  Pain Management: denies pain.  Tele/VS/O2: VSS on RA. No tele  ABNL Lab/BG: BG- 190, creatinine 0.47  Diet: Mod carb, carb counting.  Bowel/Bladder: Continent.   Skin Concerns: none ex tattoo's, do BP on R side due to new tattoo  Drains/Devices: PIV SL  Patient Stated Goal for Today: rest  Other:  No nausea, SOB. Lung sounds clear. Bowel sounds active. CMS intact. Diabetes education. Continue to monitor.

## 2023-10-02 NOTE — PROGRESS NOTES
Observation goals  PRIOR TO DISCHARGE        Comments: -diagnostic tests and consults completed and resulted- not met  -vital signs normal or at patient baseline- met  -tolerating oral intake to maintain hydration- met  -adequate pain control on oral analgesics- met  -returns to baseline functional status-met  -safe disposition plan has been identified- met  -education for diabetes completed and able to manage medications and testing at home- partially met, booklet given, demonstrated insulin administration.    Nurse to notify provider when observation goals have been met and patient is ready for discharge.

## 2023-10-02 NOTE — PROGRESS NOTES
Observation goals  PRIOR TO DISCHARGE       Comments: -diagnostic tests and consults completed and resulted- met  -vital signs normal or at patient baseline- met  -tolerating oral intake to maintain hydration- met  -adequate pain control on oral analgesics- met  -returns to baseline functional status-met  -safe disposition plan has been identified- met  -education for diabetes completed and able to manage medications and testing at home- partially met, booklet given, demonstrated insulin administration.    Nurse to notify provider when observation goals have been met and patient is ready for discharge.

## 2023-10-02 NOTE — CONSULTS
Care Management Initial Consult    General Information  Assessment completed with: Patient, Parents, Siddhartha Kerr  Type of CM/SW Visit: Initial Assessment    Primary Care Provider verified and updated as needed: Yes , Dr. Nagel  Readmission within the last 30 days: no previous admission in last 30 days      Reason for Consult: discharge planning  Advance Care Planning:            Communication Assessment  Patient's communication style: spoken language (English or Bilingual)    Hearing Difficulty or Deaf: no        Cognitive  Cognitive/Neuro/Behavioral: WDL                      Living Environment:   People in home: parent(s)     Current living Arrangements: house      Able to return to prior arrangements: yes       Family/Social Support:  Care provided by: self  Provides care for: no one  Marital Status: Single             Description of Support System:           Current Resources:   Patient receiving home care services: No     Community Resources: None  Equipment currently used at home:    Supplies currently used at home: None    Employment/Financial:  Employment Status: employed full-time        Financial Concerns: No concerns identified           Does the patient's insurance plan have a 3 day qualifying hospital stay waiver?  Yes     Which insurance plan 3 day waiver is available? Alternative insurance waiver    Will the waiver be used for post-acute placement? No    Lifestyle & Psychosocial Needs:  Social Determinants of Health     Food Insecurity: Not on file   Depression: Not on file   Housing Stability: Not on file   Tobacco Use: Low Risk  (8/17/2020)    Patient History     Smoking Tobacco Use: Never     Smokeless Tobacco Use: Never     Passive Exposure: Not on file   Financial Resource Strain: Not on file   Alcohol Use: Not on file   Transportation Needs: Not on file   Physical Activity: Not on file   Interpersonal Safety: Not on file   Stress: Not on file   Social Connections: Not on file       Functional  Status:  Prior to admission patient needed assistance:   Dependent ADLs:: Independent  Dependent IADLs:: Independent       Mental Health Status:  Mental Health Status: No Current Concerns       Chemical Dependency Status:                Values/Beliefs:  Spiritual, Cultural Beliefs, Gnosticist Practices, Values that affect care: no               Additional Information:  -writer met with patient and father at bedside. Patient tells writer she has a PCP appointment on Wednesday. She is still able to see her Pediatric PCP but will ask for how long at that appointment. She has a Endocrinologist she has seen previously but has not seen him since 2020, Dr. Jennings from the [facility]. She is planning on following up with him.  -writer called Dr. Jennings office and obtained an appointment for 11/13/23(this is the soonest available).:  Follow up appointment made for 11/13/23 at 9:45 am with Dr. Jennings   [place] [id number] Pediatric Endocrinology   [facility] [place] [address]   [phone]   Appointment placed on patient's AVS.      Spring Chance RN

## 2023-10-02 NOTE — CONSULTS
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 Small bowel movement reported morning of admission and several days prior to admission. No hematemesis. Appetite has been stable. Estimates 13 pound weight loss over the past several months. No fever, chills, or sweats. Mild to moderate headache. No increased thirst or increased urination reported. She does state \"I drink a lot of water\".\"      CURRENT DIET:  Moderate Consistent Carb (60 g CHO per Meal) Diet      NUTRITION DIAGNOSIS:  Food- and nutrition-related knowledge deficit R/t new diabetes dx     INTERVENTIONS:    Nutrition Prescription:  Diet per MD     Implementation:      *  Nutrition Education (Content):   A)  Provided handouts: Living Healthy with Diabetes, Carbohydrate Counting   B)  Discussed foods w/ CHO, CHO grams per \"1 carb choice,\" importance of consistent CHO intake, impact of CHO vs protein,fats on BGM, importance of including fats, fiber, protein w/ CHO      *  Nutrition Education (Application):   A)  Discussed current eating habits and recommended alternative food choices      *  Anticipate good compliance      *  Diet Education - refer to Education Flowsheet    Goals:  *  Patient will verbalize understanding of diet by understanding " of foods w/ CHO, CHO   counting        *  All of the above goals met during the education session    Follow Up/Monitoring:      *  Provided RD contact information for future questions      *  Recommended Out-Patient Nutrition Referral, if further diet instructions are needed      Bailey Haro RD, LD

## 2023-10-04 ENCOUNTER — TELEPHONE (OUTPATIENT)
Dept: ENDOCRINOLOGY | Facility: CLINIC | Age: 23
End: 2023-10-04
Payer: COMMERCIAL

## 2023-10-04 NOTE — TELEPHONE ENCOUNTER
Called patient and left voicemail. Patient has an appointment on 10/6/2023. Need to collect the last few days worth of blood sugar readings to prepare for patient's visit.     Scarlet Salas LPN 10/04/23 2:33 PM

## 2023-10-04 NOTE — PROGRESS NOTES
Diabetes Consult Note  October 6, 2023        Usha Wong  is a 23 year old female who has a past medical history of Anxiety, Depression, 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 (H24), and Uncomplicated asthma. She is here for follow-up of diabetes.      Usha was admitted 9/30/2023 with abdominal pain, bloating, hyperglycemia, and glycosuria with concerns of new onset diabetes mellitus. A1C was 10.5. Discharged with Lantus 8 units every morning, carb coverage with insulin aspart 1 unit per 15 g carbs, and low intensity sliding scale insulin at mealtimes.  Discharged with supplies including glucometer, lancets, test strips, needles and wipes.           Today:  Usha shares that she was admitted for 3 days following that after seeing primary care provider has increased Lantus to 9 units as fasting have not been under 200.  She has been taking 3-6 units with her meals.  Yesterday she really tried to limit carbs as blood sugar was high as blood sugar was high.    Her sliding scale begins at 140  - 239 give 1 unit, and at bedtime       Father was diagnosed with T2DM at 50 years old.  His father and on her Mom's side an uncle has type 2 and a cousin has type 1.  Mom did have gestational diabetes when pregnant with Usha (only child) but has not developed diabetes.  An aunt and a cousin also have a bit of fatty liver.      Usha reports recently she was exhausted always.  She was a student at [employer], but would crash after meals and fell behind in school.  Now diagnosed has withdrawn from most classes and is just taking drawing.  She also has been working part-time - 13 hours/week.    Otherwise in regards to her health, she has been up and having a hard time sleeping.  She was up urinating at night usually 2-3 times/ night.  For a long time she has been drinking a lot of water.  Especially over the summer she has been trying to eat more healthy.  She found that many  days she did really well as far as energy it would be much better if she didn't eat most of the day.  She also noted blurry vision intermittent for the last fear years; her therapist thought may be related to anxiety.  She does recall in April that she couldn't read license plate in front of her though it resolved 20 minutes later.  He has had irregular menses and recently went  along period without menses recently.  She had treated her CAH with OCPs previously but quit taking these and had not seen endocrinology for some time.  Her menses have been occurring an average every 39 days in the last year.  Also noted more hair loss in the last year.    She also notes that she lost a significant amount of weight.  In freshman year was up to 273 lbs.  During COVID went home and lost a lot of weight. At beginning of this summer 232 lbs and at hospital was down to 22 lbs.  When saw primary care provider Wednesday she noted In 2018 A1C was 5.4. Asked provider if had checked A1C and learned at 22 yo check up her A1C was 9.0 at [facility].     While hospitalized, her nurse told her to ask about Mounjaro and Ozempic and she is interested.  No known personal or family history of pancreatitis or pancreatic cancer or thyroid cancer. No known FH MENS.  She uses alcohol infrequently.  Perhaps 2-4 drinks 1-2 /month.      Current Diabetes Medications:    Lantus 8 units every morning,   carb coverage with insulin aspart 1 unit per 15 g carbs  Aspart 1 unit /100 >140 tid ac and >200 qhs.     We reviewed glucometer/CGMS data together.  It revealed:     - 304.  Correction ineffective.        Usha's PMH, PSH and allergies were reviewed today and pertinent information is summarized above.      Current Outpatient Medications   Medication    albuterol (PROAIR HFA/PROVENTIL HFA/VENTOLIN HFA) 108 (90 Base) MCG/ACT Inhaler    blood glucose (NO BRAND SPECIFIED) lancets standard    blood glucose (NO BRAND SPECIFIED) test strip    blood  "glucose monitoring (NO BRAND SPECIFIED) meter device kit    insulin aspart (NOVOLOG PEN) 100 UNIT/ML pen    insulin aspart (NOVOLOG PEN) 100 UNIT/ML pen    insulin aspart (NOVOLOG PEN) 100 UNIT/ML pen    insulin glargine (BASAGLAR KWIKPEN) 100 UNIT/ML pen    Multiple Vitamins-Minerals (MULTIVITAMIN GUMMIES WOMENS) CHEW    simethicone (MYLICON) 80 MG chewable tablet     No current facility-administered medications for this visit.         ROS:   Reports good physical activity tolerance.  Denies any pedal lesions or vision changes or concerns. Denies any other acute concerns except as noted above.      Exam:    There were no vitals taken for this visit.  Wt Readings from Last 10 Encounters:   10/02/23 100.8 kg (222 lb 3.2 oz)   01/20/19 113.4 kg (250 lb) (>99 %, Z= 2.46)*   11/15/18 116.6 kg (257 lb) (>99 %, Z= 2.51)*   06/13/18 114.7 kg (252 lb 12.8 oz) (>99 %, Z= 2.50)*   06/08/18 114.3 kg (252 lb) (>99 %, Z= 2.50)*   05/24/18 114.5 kg (252 lb 6.4 oz) (>99 %, Z= 2.50)*   05/12/18 110.9 kg (244 lb 7.8 oz) (>99 %, Z= 2.45)*   05/10/18 108.9 kg (240 lb) (>99 %, Z= 2.42)*   04/23/18 103.4 kg (228 lb) (99 %, Z= 2.32)*   07/28/12 68 kg (150 lb) (>99 %, Z= 2.41)*     * Growth percentiles are based on CDC (Girls, 2-20 Years) data.       General: Pleasant, well nourished and hydrated female in NAD.   Psych:  Mood is \"good,\" affect is appropriate.  Thought form and content are fluid and coherent.    HEENT: Eyes and sclera are clear. Extraocular movements are grossly intact without proptosis.  Nares are patent, mucous membranes moist.  Neck: No masses or JVD are noted.    Resp: Easy and unlabored breathing.   Neuro: Alert and oriented, communicating clearly.      Data:      Recent Labs   Lab Test 10/01/23  0750 09/30/23  0426 01/20/19  1555 06/13/18  1120 05/11/18  0837   A1C  --  10.5*  --  5.6  --    TSH  --   --   --   --  1.67   LDL  --   --   --   --  88   HDL  --   --   --   --  34*   TRIG  --   --   --   --  296*   CR " 0.47* 0.48* 0.58 0.50 0.57   AST  --  15 22 17 11   ALT  --  31 34 20 14     Her provider advised her that at 20 yo check up A1C was 9.0 at [facility].     Microalbuminuria:No results found for: UMALCR    Most recent GFRs:    Lab Results   Component Value Date    GFRESTIMATED >90 10/01/2023    GFRESTIMATED >90 09/30/2023    GFRESTIMATED GFR not calculated, patient <18 years old. 01/20/2019    GFRESTBLACK GFR not calculated, patient <18 years old. 01/20/2019    GFRESTBLACK >90 06/13/2018    GFRESTBLACK >90 05/11/2018       No results found for: CPEPT, GADAB, ISCAB  FIB-4 Calculation: 0.38 at 10/1/2023  7:50 AM  Calculated from:  SGOT/AST: 15 U/L at 9/30/2023  4:26 AM  SGPT/ALT: 31 U/L at 9/30/2023  4:26 AM  Platelets: 158 10e3/uL at 10/1/2023  7:50 AM  Age: 23 years  AST   Date Value Ref Range Status   09/30/2023 15 0 - 45 U/L Final     Comment:     Reference intervals for this test were updated on 6/12/2023 to more accurately reflect our healthy population. There may be differences in the flagging of prior results with similar values performed with this method. Interpretation of those prior results can be made in the context of the updated reference intervals.   01/20/2019 23 0 - 35 U/L Final     Lab Results   Component Value Date    ALT 31 09/30/2023    ALT 34 01/20/2019         Most recent eye exam date: : Not Found       Assessment/Plan:    Usha Wong is a 23 year old female with a past medical history of Anxiety, Depression, 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 (H24), asthma and newly diagnosed diabetes which is currently being treated with low doses of insulin      Diabetes, currently requiring treatment with insulin:  She has family history of both type 1 and 2 diabetes.  Unfortunately it appears she has had clinical evidence of diabetes since  2021 which makes this most likely to be type 2 diabetes.  She does however have a family history of type 1 diabetes  and is doing well on very low doses on insulin so I will check c-peptide and MICHELLE to assure we have the correct diagnosis.  There also are a handful of cases reports of type 1 in persons with CAH.    In either case is finding CGMS helpful in managing diet and activity.  Encouraged to walk o similar after meals. Initial diet and physical activity counseling provided.  She has an appointment with CDE upcoming to continue and to assist with beginning CGMS.  She inquired and  did discuss GLP-1 agonist on case she has type 2 diabetes.  If type 2 confirmed I do think it would be reasonable to to hold Novolog and trial GLP-1 agonist or Mounjaro.      Advised:  It sounds as though you have type 2 diabetes and apparently have since 2021.  I'm so sorry that you are just learning of this now.    Please see lab so that we can be assured that we have the correct diagnosis.    In the meantime,    Please increase Lantus to 10 units.  Please also start using Metformin.  Please start with 500 mg daily with a meal and work towards 3 tablets daily.  (See below)    If blood glucose is >150 before meals and has been >4 hours since last Novolog injection, please add the following amount to your carbohydrate dose to bring down the higher blood sugar:    For Pre-Meal  - 199 give 1 unit.   For Pre-Meal  - 249 give 2 units.   For Pre-Meal  - 299 give 3 units.   For Pre-Meal  - 349 give 4 units.   For Pre-Meal BG = or > 350 give 5 units.     BEDTIME-  If blood glucose >200 AND it has been >4 hours since last Novolog injection please give the following to help bring blood sugar into safer range.   For  - 249 give 1 units.   For  - 299 give 2 units.   For  - 349 give 3 units.   For BG = or > 350 give 4 units.       2. DM Complications-     Lifestyle modification education provided.      Retinopathy:  Sent ophthalmology referral.   Nephropathy:  Will screen for nephropathy.  Neuropathy: Denies - needs  "foot exam.  Feet: OK, denies ulcers or lesions.   Lipids: 10-year atherosclerotic cardiovascular disease risk <20%, age <40. Patient not taking statin      70 minutes spent on the date of the encounter doing chart review, history and exam, documentation, education and counseling, as well as communication and coordination of care, and further activities as noted above.  It is my privilege to be involved in the care of the above patient.     Adali Boston PA-C, MPAS  [facility]  Diabetes, Endocrinology, and Metabolism  [phone] Appointments/Nurse  [phone] pager  [phone] nurse line    This note was completed in part using Dragon voice recognition, and may contain word and grammatical errors.    Joined the call at 10/6/2023, 12:29:51 pm.  Left the call at 10/6/2023, 1:20:35 pm.  You were on the call for 50 minutes 43 seconds .        Outcome for 10/04/23 12:27 PM: Glucose readings are under \"labs\" from patients recent hospitalization.   Scarlet Salas LPN   Outcome for 10/04/23 2:33 PM: Left Voicemail   Scarlet Salas LPN   Outcome for 10/05/23 8:46 AM: Data obtained via phone and located below  Scarlet Salas LPN         10/2  6:39 PM- 183  8:16 PM- 260  9:22 PM- 230  Bedtime gave 2 units    10/3  7:19 AM- 262  8:42 AM- 344  10:03 AM- 265  12 PM- 229  1:41 PM -209  2:46 PM- 304  3:59 PM- 260  6:44 PM- 191  11:20 PM- 231     - gave 6 units at breakfast, lunch 60g carbs  5 units, dinner 60 g 5 units, bedtime 1 unit - 17 units     10/4  11 AM- 236  1 PM- 267  5:39 PM- 148  8:09 PM- 187  10:21 PM- 183    10/5  12 AM- 215  2:19 AM- 204  2:20 AM- 235  2:21 AM- 224  191  3 club crackers and cheese stick    10:30 157   - gave 4 units with lunch and dinner breakfast 5 units - 13 units TDD Novolog  10/6  Am - 181  "

## 2023-10-05 NOTE — TELEPHONE ENCOUNTER
Spoke with patient in regards to obtaining glucose readings post hospital discharge. Data obtained via phone and located below.    10/2  6:39 PM- 183  8:16 PM- 260  9:22 PM- 230    10/3  7:19 AM- 262  8:42 AM- 344  10:03 AM- 265  12 PM- 229  1:41 PM -209  2:46 PM- 304  3:59 PM- 260  6:44 PM- 191  11:20 PM- 231    10/4  11 AM- 236  1 PM- 267  5:39 PM- 148  8:09 PM- 187  10:21 PM- 183    10/5  12 AM- 215  2:19 AM- 204  2:20 AM- 235  2:21 AM- 224      Scarlet Salas LPN 10/05/23 8:46 AM

## 2023-10-06 ENCOUNTER — VIRTUAL VISIT (OUTPATIENT)
Dept: ENDOCRINOLOGY | Facility: CLINIC | Age: 23
End: 2023-10-06
Attending: NURSE PRACTITIONER
Payer: COMMERCIAL

## 2023-10-06 ENCOUNTER — TELEPHONE (OUTPATIENT)
Dept: ENDOCRINOLOGY | Facility: CLINIC | Age: 23
End: 2023-10-06

## 2023-10-06 DIAGNOSIS — E11.9 INSULIN-REQUIRING OR DEPENDENT TYPE II DIABETES MELLITUS (H): Primary | ICD-10-CM

## 2023-10-06 DIAGNOSIS — E11.9 DIABETES MELLITUS, NEW ONSET (H): ICD-10-CM

## 2023-10-06 DIAGNOSIS — Z79.4 INSULIN-REQUIRING OR DEPENDENT TYPE II DIABETES MELLITUS (H): Primary | ICD-10-CM

## 2023-10-06 PROCEDURE — 99417 PROLNG OP E/M EACH 15 MIN: CPT | Mod: VID | Performed by: PHYSICIAN ASSISTANT

## 2023-10-06 PROCEDURE — 99215 OFFICE O/P EST HI 40 MIN: CPT | Mod: VID | Performed by: PHYSICIAN ASSISTANT

## 2023-10-06 RX ORDER — BLOOD-GLUCOSE SENSOR
1 EACH MISCELLANEOUS
Qty: 2 EACH | Refills: 3 | Status: SHIPPED | OUTPATIENT
Start: 2023-10-06 | End: 2024-04-12

## 2023-10-06 RX ORDER — METFORMIN HCL 500 MG
1500 TABLET, EXTENDED RELEASE 24 HR ORAL
Qty: 270 TABLET | Refills: 1 | Status: SHIPPED | OUTPATIENT
Start: 2023-10-06 | End: 2024-01-26

## 2023-10-06 NOTE — LETTER
10/6/2023       RE: Usha Wong  [address]  [place] [address]     Dear Colleague,    Thank you for referring your patient, Usha Wong, to the [facility] [place] [facility]. Please see a copy of my visit note below.             Diabetes Consult Note  October 6, 2023        Usha Wong  is a 23 year old female who has a past medical history of Anxiety, Depression, 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 (H24), and Uncomplicated asthma. She is here for follow-up of diabetes.      Usha was admitted 9/30/2023 with abdominal pain, bloating, hyperglycemia, and glycosuria with concerns of new onset diabetes mellitus. A1C was 10.5. Discharged with Lantus 8 units every morning, carb coverage with insulin aspart 1 unit per 15 g carbs, and low intensity sliding scale insulin at mealtimes.  Discharged with supplies including glucometer, lancets, test strips, needles and wipes.           Today:  Usha shares that she was admitted for 3 days following that after seeing primary care provider has increased Lantus to 9 units as fasting have not been under 200.  She has been taking 3-6 units with her meals.  Yesterday she really tried to limit carbs as blood sugar was high as blood sugar was high.    Her sliding scale begins at 140  - 239 give 1 unit, and at bedtime       Father was diagnosed with T2DM at 50 years old.  His father and on her Mom's side an uncle has type 2 and a cousin has type 1.  Mom did have gestational diabetes when pregnant with Usha (only child) but has not developed diabetes.  An aunt and a cousin also have a bit of fatty liver.      Usha reports recently she was exhausted always.  She was a student at [employer], but would crash after meals and fell behind in school.  Now diagnosed has withdrawn from most classes and is just taking drawing.  She  also has been working part-time - 13 hours/week.    Otherwise in regards to her health, she has been up and having a hard time sleeping.  She was up urinating at night usually 2-3 times/ night.  For a long time she has been drinking a lot of water.  Especially over the summer she has been trying to eat more healthy.  She found that many days she did really well as far as energy it would be much better if she didn't eat most of the day.  She also noted blurry vision intermittent for the last fear years; her therapist thought may be related to anxiety.  She does recall in April that she couldn't read license plate in front of her though it resolved 20 minutes later.  He has had irregular menses and recently went  along period without menses recently.  She had treated her CAH with OCPs previously but quit taking these and had not seen endocrinology for some time.  Her menses have been occurring an average every 39 days in the last year.  Also noted more hair loss in the last year.    She also notes that she lost a significant amount of weight.  In freshman year was up to 273 lbs.  During COVID went home and lost a lot of weight. At beginning of this summer 232 lbs and at hospital was down to 22 lbs.  When saw primary care provider Wednesday she noted In 2018 A1C was 5.4. Asked provider if had checked A1C and learned at 22 yo check up her A1C was 9.0 at [facility].     While hospitalized, her nurse told her to ask about Mounjaro and Ozempic and she is interested.  No known personal or family history of pancreatitis or pancreatic cancer or thyroid cancer. No known FH MENS.  She uses alcohol infrequently.  Perhaps 2-4 drinks 1-2 /month.      Current Diabetes Medications:    Lantus 8 units every morning,   carb coverage with insulin aspart 1 unit per 15 g carbs  Aspart 1 unit /100 >140 tid ac and >200 qhs.     We reviewed glucometer/CGMS data together.  It revealed:     - 304.  Correction ineffective.        Usha's  "PMH, PSH and allergies were reviewed today and pertinent information is summarized above.      Current Outpatient Medications   Medication    albuterol (PROAIR HFA/PROVENTIL HFA/VENTOLIN HFA) 108 (90 Base) MCG/ACT Inhaler    blood glucose (NO BRAND SPECIFIED) lancets standard    blood glucose (NO BRAND SPECIFIED) test strip    blood glucose monitoring (NO BRAND SPECIFIED) meter device kit    insulin aspart (NOVOLOG PEN) 100 UNIT/ML pen    insulin aspart (NOVOLOG PEN) 100 UNIT/ML pen    insulin aspart (NOVOLOG PEN) 100 UNIT/ML pen    insulin glargine (BASAGLAR KWIKPEN) 100 UNIT/ML pen    Multiple Vitamins-Minerals (MULTIVITAMIN GUMMIES WOMENS) CHEW    simethicone (MYLICON) 80 MG chewable tablet     No current facility-administered medications for this visit.         ROS:   Reports good physical activity tolerance.  Denies any pedal lesions or vision changes or concerns. Denies any other acute concerns except as noted above.      Exam:    There were no vitals taken for this visit.  Wt Readings from Last 10 Encounters:   10/02/23 100.8 kg (222 lb 3.2 oz)   01/20/19 113.4 kg (250 lb) (>99 %, Z= 2.46)*   11/15/18 116.6 kg (257 lb) (>99 %, Z= 2.51)*   06/13/18 114.7 kg (252 lb 12.8 oz) (>99 %, Z= 2.50)*   06/08/18 114.3 kg (252 lb) (>99 %, Z= 2.50)*   05/24/18 114.5 kg (252 lb 6.4 oz) (>99 %, Z= 2.50)*   05/12/18 110.9 kg (244 lb 7.8 oz) (>99 %, Z= 2.45)*   05/10/18 108.9 kg (240 lb) (>99 %, Z= 2.42)*   04/23/18 103.4 kg (228 lb) (99 %, Z= 2.32)*   07/28/12 68 kg (150 lb) (>99 %, Z= 2.41)*     * Growth percentiles are based on [facility] (Girls, 2-20 Years) data.       General: Pleasant, well nourished and hydrated female in NAD.   Psych:  Mood is \"good,\" affect is appropriate.  Thought form and content are fluid and coherent.    HEENT: Eyes and sclera are clear. Extraocular movements are grossly intact without proptosis.  Nares are patent, mucous membranes moist.  Neck: No masses or JVD are noted.    Resp: Easy and unlabored " breathing.   Neuro: Alert and oriented, communicating clearly.      Data:      Recent Labs   Lab Test 10/01/23  0750 09/30/23  0426 01/20/19  1555 06/13/18  1120 05/11/18  0837   A1C  --  10.5*  --  5.6  --    TSH  --   --   --   --  1.67   LDL  --   --   --   --  88   HDL  --   --   --   --  34*   TRIG  --   --   --   --  296*   CR 0.47* 0.48* 0.58 0.50 0.57   AST  --  15 22 17 11   ALT  --  31 34 20 14     Her provider advised her that at 22 yo check up A1C was 9.0 at [facility].     Microalbuminuria:No results found for: UMALCR    Most recent GFRs:    Lab Results   Component Value Date    GFRESTIMATED >90 10/01/2023    GFRESTIMATED >90 09/30/2023    GFRESTIMATED GFR not calculated, patient <18 years old. 01/20/2019    GFRESTBLACK GFR not calculated, patient <18 years old. 01/20/2019    GFRESTBLACK >90 06/13/2018    GFRESTBLACK >90 05/11/2018       No results found for: CPEPT, GADAB, ISCAB  FIB-4 Calculation: 0.38 at 10/1/2023  7:50 AM  Calculated from:  SGOT/AST: 15 U/L at 9/30/2023  4:26 AM  SGPT/ALT: 31 U/L at 9/30/2023  4:26 AM  Platelets: 158 10e3/uL at 10/1/2023  7:50 AM  Age: 23 years  AST   Date Value Ref Range Status   09/30/2023 15 0 - 45 U/L Final     Comment:     Reference intervals for this test were updated on 6/12/2023 to more accurately reflect our healthy population. There may be differences in the flagging of prior results with similar values performed with this method. Interpretation of those prior results can be made in the context of the updated reference intervals.   01/20/2019 23 0 - 35 U/L Final     Lab Results   Component Value Date    ALT 31 09/30/2023    ALT 34 01/20/2019         Most recent eye exam date: : Not Found       Assessment/Plan:    Usha Wong is a 23 year old female with a past medical history of Anxiety, Depression, 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 (H24), asthma and newly diagnosed diabetes which is currently being  treated with low doses of insulin      Diabetes, currently requiring treatment with insulin:  She has family history of both type 1 and 2 diabetes.  Unfortunately it appears she has had clinical evidence of diabetes since  2021 which makes this most likely to be type 2 diabetes.  She does however have a family history of type 1 diabetes and is doing well on very low doses on insulin so I will check c-peptide and MICHELLE to assure we have the correct diagnosis.  There also are a handful of cases reports of type 1 in persons with CAH.    In either case is finding CGMS helpful in managing diet and activity.  Encouraged to walk o similar after meals. Initial diet and physical activity counseling provided.  She has an appointment with CDE upcoming to continue and to assist with beginning CGMS.  She inquired and  did discuss GLP-1 agonist on case she has type 2 diabetes.  If type 2 confirmed I do think it would be reasonable to to hold Novolog and trial GLP-1 agonist or Mounjaro.      Advised:  It sounds as though you have type 2 diabetes and apparently have since 2021.  I'm so sorry that you are just learning of this now.    Please see lab so that we can be assured that we have the correct diagnosis.    In the meantime,    Please increase Lantus to 10 units.  Please also start using Metformin.  Please start with 500 mg daily with a meal and work towards 3 tablets daily.  (See below)    If blood glucose is >150 before meals and has been >4 hours since last Novolog injection, please add the following amount to your carbohydrate dose to bring down the higher blood sugar:    For Pre-Meal  - 199 give 1 unit.   For Pre-Meal  - 249 give 2 units.   For Pre-Meal  - 299 give 3 units.   For Pre-Meal  - 349 give 4 units.   For Pre-Meal BG = or > 350 give 5 units.     BEDTIME-  If blood glucose >200 AND it has been >4 hours since last Novolog injection please give the following to help bring blood sugar into safer  "range.   For  - 249 give 1 units.   For  - 299 give 2 units.   For  - 349 give 3 units.   For BG = or > 350 give 4 units.       2. DM Complications-     Lifestyle modification education provided.      Retinopathy:  Sent ophthalmology referral.   Nephropathy:  Will screen for nephropathy.  Neuropathy: Denies - needs foot exam.  Feet: OK, denies ulcers or lesions.   Lipids: 10-year atherosclerotic cardiovascular disease risk <20%, age <40. Patient not taking statin      70 minutes spent on the date of the encounter doing chart review, history and exam, documentation, education and counseling, as well as communication and coordination of care, and further activities as noted above.  It is my privilege to be involved in the care of the above patient.     Adali Boston PA-C, MPAS  [facility]  Diabetes, Endocrinology, and Metabolism  [phone] Appointments/Nurse  [phone] pager  [phone] nurse line    This note was completed in part using Dragon voice recognition, and may contain word and grammatical errors.    Joined the call at 10/6/2023, 12:29:51 pm.  Left the call at 10/6/2023, 1:20:35 pm.  You were on the call for 50 minutes 43 seconds .        Outcome for 10/04/23 12:27 PM: Glucose readings are under \"labs\" from patients recent hospitalization.   Scarlet Salas LPN   Outcome for 10/04/23 2:33 PM: Left Voicemail   Scarlet Salas LPN   Outcome for 10/05/23 8:46 AM: Data obtained via phone and located below  Scarlet Salas LPN         10/2  6:39 PM- 183  8:16 PM- 260  9:22 PM- 230  Bedtime gave 2 units    10/3  7:19 AM- 262  8:42 AM- 344  10:03 AM- 265  12 PM- 229  1:41 PM -209  2:46 PM- 304  3:59 PM- 260  6:44 PM- 191  11:20 PM- 231     - gave 6 units at breakfast, lunch 60g carbs  5 units, dinner 60 g 5 units, bedtime 1 unit - 17 units     10/4  11 AM- 236  1 PM- 267  5:39 PM- 148  8:09 PM- 187  10:21 PM- 183    10/5  12 AM- 215  2:19 AM- 204  2:20 AM- 235  2:21 AM- " 224  191  3 club crackers and cheese stick    10:30 157   - gave 4 units with lunch and dinner breakfast 5 units - 13 units TDD Novolog  10/6  Am - 181

## 2023-10-06 NOTE — PATIENT INSTRUCTIONS
Dear Usha,    It sounds as though you have type 2 diabetes and apparently have since 2021.  I'm so sorry that you are just learning of this now.    Please see lab so that we can be assured that we have the correct diagnosis.    In the meantime,    Please increase Lantus to 10 units.  Please also start using Metformin.  Please start with 500 mg daily with a meal and work towards 3 tablets daily.  (See below)    If blood glucose is >150 before meals and has been >4 hours since last Novolog injection, please add the following amount to your carbohydrate dose to bring down the higher blood sugar:    For Pre-Meal  - 199 give 1 unit.   For Pre-Meal  - 249 give 2 units.   For Pre-Meal  - 299 give 3 units.   For Pre-Meal  - 349 give 4 units.   For Pre-Meal BG = or > 350 give 5 units.     BEDTIME-  If blood glucose >200 AND it has been >4 hours since last Novolog injection please give the following to help bring blood sugar into safer range.   For  - 249 give 1 units.   For  - 299 give 2 units.   For  - 349 give 3 units.   For BG = or > 350 give 4 units.       Metformin titration    Some patients will experience stomach upset when starting Metformin including stomachache, nausea, loose stools and even at times vomiting and diarrhea. Some persons can start right away with 2000 mg daily, but this is generally uncommon.    All of these symptoms can be minimized by starting with a low dose and by taking the medication with meals.    Most patients are successful starting with 500 mg with a larger afternoon or evening meal and then 10 -14 days later, after any GI symptoms have resolved, adding a second tablet with another meal and if needed 3rd and 4th tablets 2 and 4 weeks later.  Some persons with more sensitive stomachs will need to carefully add in 1/2 tablet every 10-14 days with or following meals.  Once your body is used to the medication however, most patients are able to take  their full dose of Metformin once daily with any meal.    Please have patience with this medication.  While it can be uncomfortable during the titration period, the vast majority of patients are able to tolerate this medication and it is the most effective initial medication for persons with type 2 diabetes.  Please contact our office if you feel your symptoms are intolerable or otherwise concerning.      My best wishes,    Adali Boston PA-C, [facility]  [facility]  Diabetes, Endocrinology, and Metabolism  [phone] Appointments/Nurse  [phone] Fax    It is good to speak with you today!  I am here to support your health care and life goals by helping you to manage your diabetes and prevent complications.  Please let me know of anything more I might do to support this and review below to be aware of recommendations and resources that might apply to you and your situation.  With deep appreciation and my best wishes for great health,  Adali      Please contact us to schedule at any of our [place] lab locations  Call [phone] ([phone]), select option [phone].    For [facility] lab at [address] [place] [address], call : [phone].    Please be sure to get your eye exam done annually as well as microalbuminuria test at lab if you do not see a nephrologist.      Our diabetic foot providers, Dr. Holm and Yvan are available to see patients with pedal ulcers or other concerns.   Please be sure to let us know about any lesions that are not healing readily or other concerns with your feet!    If you are smoking, I do recommend that you quit or, if unable, reduce you use and help is available!  Our clinical pharmacists can work with you to develop a quit or reduction plan that may include nicotine replacement if desired and offer the support and knowledge to help you quit smoking.  Call to schedule or let me know if you would like a referral!    Please work to meet recommendations for physical  activity which is 30 minutes aerobic activity at 6 days each week and resistance or weight training 2-3 times /week.  If you are >60 years old, please be sure you are including exercises to help you maintain good balance and prevent falls as you continue to age.  Please advise if you you would like a referral to physical therapy to assist you in developing a personalized and appropriate program for yourself.     If you are on insulin and have ever had any low blood sugars that you are unable to treat your self we need a plan to prevent this, but you also MUST ALSO HAVE a prescription for GLUCAGON, nasal inhaled or injectable.  Please let me know right away, if you need this refilled.      If you are on a pump, please let me know if you do not have a backup insulin plan in case of pump failure, including a prescription for long-acting insulin, and knowledge of what dose you should use in case of pump failure.    Also many heart healthy food ideas are available at:  https://www.diabetesfoodhub.org/    If you do not already have a primary care provider it is very important that you do make an appointment to establish care with a family medicine, internal medicine, adult medicine, or med/peds primary care provider.  To establish primary care at [facility] with an Internal Medicine Physician, please call [phone] and ask for Internal Medicine/Primary Care clinic appointment.    Diabetes Education and Nutrition providers are also available to support you in matching your lifestyle and health goals as well as keep up with technology that can help your blood sugar management less burdensome and time consuming while getting better results!  Please let us know any time you would like to schedule with nutrition or diabetes educator; most insurers cover 2 - 6 hours of education annually and I see better outcomes in patients who take advantage of this.  Knowledge is power of course!    Blood pressure management is a  VERY IMPORTANT part of diabetes management.  If you are having higher blood pressures in clinic >140/90, PLEASE GET A HOME BLOOD PRESSURE cuff and check your blood sugar after sitting for 5 minutes.  If it is >140/90 seated resting at home it is imperative that you let me, your general practitioner or nephrologist know so that we can make a plan to address this.    Did you know that persons with diabetes have significantly increased rates of both depression and anxiety?  This can also increase your risk for cardiovascular disease and is treatable and manageable!  Health Psychology is a specialty that helps people cope with the stress and anxiety that often occurs with illness, emotional and psychological issues, and preparation for medical treatment including surgical procedures.  We have a number of great providers here at [facility], including Dr. Suzanna Raymundo who specializes in health psychology and behavioral medicine with clinical expertise in stress-related and inflammatory diseases, diabetes, obesity, and addressing health inequities. Information to schedule with her is below or please feel free to discuss your concerns with me at your next appointment.    If you are having difficulty affording enough food, our SW Malka Ortiz can help you access [employer] which gives you $80 /month  food credit at certain locations including [employer] Services for All, which everyone can access.  Groceries at 40% less than in stores.  https://www.thefoodgroupmn.org/groceries/fare-for-all/      HEALTH PSYCHOLOGY    What is Health Psychology?  Health Psychology is a specialty that helps people cope with the stress and anxiety that often occurs with illness, emotional and psychological issues, and preparation for medical treatment including surgical procedures.    Telehealth services are now being provided to patients.    Location:    [facility]                       [address] [place] [id number]                      Please arrive 15 minutes early to check in for in-person appointments.       We can help patients affected by  Adjustment problems  Anxiety  Cancer  Cardiovascular disorders  Chronic digestive disorders  Depression  Diabetes  Disability  Health-related behavior change  Insomnia  Other medical and nervous system conditions  People experiencing or wishing to prevent health problems  Pulmonary/lung conditions  Smoking cessation  Transplants    Treatments we offer include  Psychological assessment  Psychotherapy/counseling  Cognitive behavioral therapy  Relaxation training  Behavior therapy  Motivational interviewing  Stress management    How We Help  Coping: We help people with the emotional issues related to their illness.  Challenges: Difficult challenges seem to increase during illness. We teach steps and strategies for managing stressful situations.  Feelings: We help people deal with anger, anxiety, confusion, depression, fear, frustration, grief, loss of control, sadness, uncertainty, and motivational issues.  Behaviors: When people are ill, it can be harder to take care of themselves. We help people manage weight, follow health regimens, relax, and quit smoking.  Counseling: We offer several types of counseling and can create a plan that meets needs of a patient. Our practice works with individuals, couples, and families.    Our Care Team  Working with primary and specialty physicians at [facility] and [facility], we see patients in the hospital and clinic, allowing us to coordinate our services with the rest of people's medical needs.       To Schedule an Appointment  New patient appointments are generally scheduled through the Central Scheduling number: [phone].        NOTE: Services are confidential. Insurance coverage varies. Mental health benefits of health plans may have different levels of coverage than medical benefits. Please confirm the coverage  details with the insurance plan or our business office.      My best wishes,    Adali Boston PA-C, MPAS  [facility]  Diabetes, Endocrinology, and Metabolism  [phone] Appointments/Nurse  [phone] Fax

## 2023-10-06 NOTE — NURSING NOTE
Is the patient currently in the state of [place]? YES    Visit mode:VIDEO    If the visit is dropped, the patient can be reconnected by: VIDEO VISIT: Send to e-mail at: [email or web address]    Will anyone else be joining the visit? NO  (If patient encounters technical issues they should call [phone]    How would you like to obtain your AVS? MyChart    Are changes needed to the allergy or medication list? Pt declined no changes since completing e-check in for visit.     Reason for visit: RECHECK (Patient notes concerns regarding symptoms in relation to her conditions. Patient notes questions regarding medications (Mounjaro or Ozempic))    Angelica JEFF

## 2023-10-06 NOTE — TELEPHONE ENCOUNTER
Prior Authorization Approval    Med[facility]  Authorization Effective Date: 9/6/2023  Authorization Expiration Date: 10/5/2026  Approved Dose/Quantity: 2 per 28 days  Reference #: [id number]   Insurance Company: [employer] - Phone [phone] Fax [phone]  Expected CoPay: $    CoPay Card Available:      Financial Assistance Needed:   Which Pharmacy is filling the prescription: [id number] - [place], [place] - [address] [place] [address]  Pharmacy Notified:   Patient Notified:

## 2023-10-11 ENCOUNTER — OFFICE VISIT (OUTPATIENT)
Dept: FAMILY MEDICINE | Facility: CLINIC | Age: 23
End: 2023-10-11
Payer: COMMERCIAL

## 2023-10-11 VITALS
SYSTOLIC BLOOD PRESSURE: 123 MMHG | HEIGHT: 67 IN | BODY MASS INDEX: 35 KG/M2 | RESPIRATION RATE: 18 BRPM | OXYGEN SATURATION: 99 % | WEIGHT: 223 LBS | DIASTOLIC BLOOD PRESSURE: 86 MMHG | TEMPERATURE: 97.6 F | HEART RATE: 98 BPM

## 2023-10-11 DIAGNOSIS — E66.812 CLASS 2 OBESITY WITH BODY MASS INDEX (BMI) OF 35.0 TO 35.9 IN ADULT, UNSPECIFIED OBESITY TYPE, UNSPECIFIED WHETHER SERIOUS COMORBIDITY PRESENT: ICD-10-CM

## 2023-10-11 DIAGNOSIS — J30.2 SEASONAL ALLERGIC RHINITIS, UNSPECIFIED TRIGGER: ICD-10-CM

## 2023-10-11 DIAGNOSIS — Z09 HOSPITAL DISCHARGE FOLLOW-UP: Primary | ICD-10-CM

## 2023-10-11 DIAGNOSIS — E11.9 DIABETES MELLITUS, NEW ONSET (H): ICD-10-CM

## 2023-10-11 LAB
ANION GAP SERPL CALCULATED.3IONS-SCNC: 14 MMOL/L (ref 7–15)
BASO+EOS+MONOS # BLD AUTO: ABNORMAL 10*3/UL
BASO+EOS+MONOS NFR BLD AUTO: ABNORMAL %
BASOPHILS # BLD AUTO: 0 10E3/UL (ref 0–0.2)
BASOPHILS NFR BLD AUTO: 0 %
BUN SERPL-MCNC: 13.4 MG/DL (ref 6–20)
C PEPTIDE SERPL-MCNC: 4 NG/ML (ref 0.9–6.9)
CALCIUM SERPL-MCNC: 10 MG/DL (ref 8.6–10)
CHLORIDE SERPL-SCNC: 105 MMOL/L (ref 98–107)
CHOLEST SERPL-MCNC: 141 MG/DL
CREAT SERPL-MCNC: 0.53 MG/DL (ref 0.51–0.95)
CREAT UR-MCNC: 113 MG/DL
DEPRECATED HCO3 PLAS-SCNC: 21 MMOL/L (ref 22–29)
EGFRCR SERPLBLD CKD-EPI 2021: >90 ML/MIN/1.73M2
EOSINOPHIL # BLD AUTO: 0.1 10E3/UL (ref 0–0.7)
EOSINOPHIL NFR BLD AUTO: 1 %
ERYTHROCYTE [DISTWIDTH] IN BLOOD BY AUTOMATED COUNT: 12.3 % (ref 10–15)
FASTING STATUS PATIENT QL REPORTED: NO
GLUCOSE SERPL-MCNC: 171 MG/DL (ref 70–99)
GLUCOSE SERPL-MCNC: 171 MG/DL (ref 70–99)
HCT VFR BLD AUTO: 43.8 % (ref 35–47)
HDLC SERPL-MCNC: 30 MG/DL
HGB BLD-MCNC: 14.3 G/DL (ref 11.7–15.7)
IMM GRANULOCYTES # BLD: 0 10E3/UL
IMM GRANULOCYTES NFR BLD: 0 %
LDLC SERPL CALC-MCNC: 76 MG/DL
LYMPHOCYTES # BLD AUTO: 2.1 10E3/UL (ref 0.8–5.3)
LYMPHOCYTES NFR BLD AUTO: 26 %
MCH RBC QN AUTO: 26.8 PG (ref 26.5–33)
MCHC RBC AUTO-ENTMCNC: 32.6 G/DL (ref 31.5–36.5)
MCV RBC AUTO: 82 FL (ref 78–100)
MICROALBUMIN UR-MCNC: <12 MG/L
MICROALBUMIN/CREAT UR: NORMAL MG/G{CREAT}
MONOCYTES # BLD AUTO: 0.4 10E3/UL (ref 0–1.3)
MONOCYTES NFR BLD AUTO: 5 %
NEUTROPHILS # BLD AUTO: 5.5 10E3/UL (ref 1.6–8.3)
NEUTROPHILS NFR BLD AUTO: 67 %
NONHDLC SERPL-MCNC: 111 MG/DL
PLATELET # BLD AUTO: 220 10E3/UL (ref 150–450)
POTASSIUM SERPL-SCNC: 4.1 MMOL/L (ref 3.4–5.3)
RBC # BLD AUTO: 5.33 10E6/UL (ref 3.8–5.2)
SODIUM SERPL-SCNC: 140 MMOL/L (ref 135–145)
TRIGL SERPL-MCNC: 173 MG/DL
TSH SERPL DL<=0.005 MIU/L-ACNC: 3.03 UIU/ML (ref 0.3–4.2)
WBC # BLD AUTO: 8.2 10E3/UL (ref 4–11)

## 2023-10-11 PROCEDURE — 90686 IIV4 VACC NO PRSV 0.5 ML IM: CPT | Performed by: PHYSICIAN ASSISTANT

## 2023-10-11 PROCEDURE — 91320 SARSCV2 VAC 30MCG TRS-SUC IM: CPT | Performed by: PHYSICIAN ASSISTANT

## 2023-10-11 PROCEDURE — 80048 BASIC METABOLIC PNL TOTAL CA: CPT | Performed by: PHYSICIAN ASSISTANT

## 2023-10-11 PROCEDURE — 99000 SPECIMEN HANDLING OFFICE-LAB: CPT | Performed by: PHYSICIAN ASSISTANT

## 2023-10-11 PROCEDURE — 99495 TRANSJ CARE MGMT MOD F2F 14D: CPT | Mod: 25 | Performed by: PHYSICIAN ASSISTANT

## 2023-10-11 PROCEDURE — 86341 ISLET CELL ANTIBODY: CPT | Mod: 90 | Performed by: PHYSICIAN ASSISTANT

## 2023-10-11 PROCEDURE — 84681 ASSAY OF C-PEPTIDE: CPT | Performed by: PHYSICIAN ASSISTANT

## 2023-10-11 PROCEDURE — 90480 ADMN SARSCOV2 VAC 1/ONLY CMP: CPT | Performed by: PHYSICIAN ASSISTANT

## 2023-10-11 PROCEDURE — 82043 UR ALBUMIN QUANTITATIVE: CPT | Performed by: PHYSICIAN ASSISTANT

## 2023-10-11 PROCEDURE — 84443 ASSAY THYROID STIM HORMONE: CPT | Performed by: PHYSICIAN ASSISTANT

## 2023-10-11 PROCEDURE — 99207 PR FOOT EXAM NO CHARGE: CPT | Performed by: PHYSICIAN ASSISTANT

## 2023-10-11 PROCEDURE — 82570 ASSAY OF URINE CREATININE: CPT | Performed by: PHYSICIAN ASSISTANT

## 2023-10-11 PROCEDURE — 90471 IMMUNIZATION ADMIN: CPT | Performed by: PHYSICIAN ASSISTANT

## 2023-10-11 PROCEDURE — 80061 LIPID PANEL: CPT | Performed by: PHYSICIAN ASSISTANT

## 2023-10-11 PROCEDURE — 36415 COLL VENOUS BLD VENIPUNCTURE: CPT | Performed by: PHYSICIAN ASSISTANT

## 2023-10-11 PROCEDURE — 85025 COMPLETE CBC W/AUTO DIFF WBC: CPT | Performed by: PHYSICIAN ASSISTANT

## 2023-10-11 ASSESSMENT — PAIN SCALES - GENERAL: PAINLEVEL: NO PAIN (0)

## 2023-10-11 ASSESSMENT — PATIENT HEALTH QUESTIONNAIRE - PHQ9: SUM OF ALL RESPONSES TO PHQ QUESTIONS 1-9: 15

## 2023-10-11 NOTE — PROGRESS NOTES
"  Assessment & Plan     Hospital discharge follow-up  Diabetes mellitus, new onset (H)  Class 2 obesity with body mass index (BMI) of 35.0 to 35.9 in adult, unspecified obesity type, unspecified whether serious comorbidity present    Spent majority of our visit getting to know her and reviewing her most recent hospitalization.  Seems to be very motivated to manage her diabetes which she knows will significantly improve her diabetes which she knows will significantly improve her quality of life.  Has more or less been feeling unwell over the past few years.  Happy she is established with endocrinology and has an upcoming appointment with diabetes education.  Provided diet recommendations.  CGM is ready to  which she will get today.  Upcoming diabetic eye exam.  We will plan for follow-up in 3 months.  We will complete endocrinology orders today in addition to CBC, BMP, and TSH.  Encouraged her to reach out sooner with any thing in the meantime.    - CBC with platelets and differential  - Basic metabolic panel  (Ca, Cl, CO2, Creat, Gluc, K, Na, BUN)  - TSH with free T4 reflex  - FOOT EXAM  - Lipid panel reflex to direct LDL Non-fasting  - C-peptide  - Glutamic acid decarboxylase (MICHELLE) antibody  - Albumin Random Urine Quantitative with Creat Ratio  - Glucose    Seasonal allergic rhinitis  Suspect sinus congestion sore throat secondary to seasonal allergies.  Restart Zyrtec and Flonase.  Stay well-hydrated.  Close monitoring for new or worsening symptoms.      30 minutes spent by me on the date of the encounter doing chart review, review of test results, interpretation of tests, patient visit, and documentation      MED REC REQUIRED  Post Medication Reconciliation Status: discharge medications reconciled and changed, per note/orders  BMI:   Estimated body mass index is 35.21 kg/m  as calculated from the following:    Height as of this encounter: 1.695 m (5' 6.73\").    Weight as of this encounter: 101.2 kg (223 " lb).   Weight management plan: Discussed healthy diet and exercise guidelines    The likelihood of other entities in the differential is insufficient to justify any further testing for them at this time. This was explained to the patient. The patient was advised that persistent or worsening symptoms would require further evaluation. Patient advised to call the office and if unable to reach to go to the emergency room if they develop any new or worsening symptoms. Expressed understanding and agreement with above stated plan.     Sagar Spangler PA-C  [facility] MARK Kerr is a 23 year old, presenting for the following health issues:  Hospital F/U, Establish Care, and Symptoms (Stuffed nose and sore throat)    Here today for hospital discharge follow-up and to establish care!   New DX of Diabetes. Labs today to determine Type 1 vs Type 2 from Endo who she has established with. DM education upcoming. Did have a 9.0% A1c in 2020 which she was unware of at her peds office.     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  10 units of long-acting insulin every morning.  Short acting sliding scale.  To start metformin 1 tablet daily, slowly increasing to 1500 mg.  Tolerating okay.  Upcoming eye exam.    Does a stuffy nose as well as a sore throat over the past day.  Off Zyrtec over the past few weeks.  Apartment building recently turned on heat which may be contributing.    Hospital Follow-up Visit:    Hospital/Nursing Home/IP Rehab Facility: [facility]  Date of Admission: 09/30/2023  Date of Discharge: 10/02/2023  Reason(s) for Admission:     New onset diabetes mellitus  Hyperglycemia  Glucosuria  Abdominal pain  Nausea and vomiting  Isolated episode of blood-streaked stool  Diffuse hepatic steatosis on CT    Was your hospitalization related to COVID-19? No   Problems taking medications regularly:  None  Medication changes since discharge: None  Problems  "adhering to non-medication therapy:      Summary of hospitalization:  [facility] discharge summary reviewed  Diagnostic Tests/Treatments reviewed.  Follow up needed: endo, DM Edu, primary  Other Healthcare Providers Involved in Patient s Care:         None  Update since discharge: improved.         Plan of care communicated with patient                 Review of Systems   Constitutional, HEENT, cardiovascular, pulmonary, GI, , musculoskeletal, neuro, skin, endocrine and psych systems are negative, except as otherwise noted.      Objective    /86 (BP Location: Right arm, Patient Position: Sitting, Cuff Size: Adult Large)   Pulse 98   Temp 97.6  F (36.4  C)   Resp 18   Ht 1.695 m (5' 6.73\")   Wt 101.2 kg (223 lb)   LMP 10/06/2023 (Exact Date)   SpO2 99%   BMI 35.21 kg/m    Body mass index is 35.21 kg/m .  Physical Exam   GENERAL: healthy, alert and no distress  EYES: Eyes grossly normal to inspection, PERRL and conjunctivae and sclerae normal  HENT: Postnasal drip changes noted in the posterior oropharynx otherwise ear canals and TM's normal, nose and mouth without ulcers or lesions  NECK: no adenopathy, no asymmetry, masses, or scars and thyroid normal to palpation  RESP: lungs clear to auscultation - no rales, rhonchi or wheezes  CV: regular rate and rhythm, normal S1 S2, no S3 or S4, no murmur, click or rub, no peripheral edema  MS: no gross musculoskeletal defects noted, no edema  FOOT: Normal sensation bilaterally with no visible ulcerations  SKIN: no suspicious lesions or rashes  NEURO: Normal strength and tone, mentation intact and speech normal  PSYCH: mentation appears normal, affect normal/bright              "

## 2023-10-12 NOTE — RESULT ENCOUNTER NOTE
Matthew Kerr,    It was really nice meeting you yesterday!     - Improving CBC (white blood cells, hemoglobin and platelets)    - Normal electrolytes, kidney function and liver enzymes. Glucose was 171. Heading in the right direction    - Normal Thyroid function    Let me know if you have any questions or concerns,     Sagar Spangler PA-C  [facility]

## 2023-10-16 LAB — GAD65 AB SER IA-ACNC: <5 IU/ML

## 2023-10-26 ENCOUNTER — ALLIED HEALTH/NURSE VISIT (OUTPATIENT)
Dept: EDUCATION SERVICES | Facility: CLINIC | Age: 23
End: 2023-10-26
Payer: COMMERCIAL

## 2023-10-26 DIAGNOSIS — E11.9 DIABETES MELLITUS, NEW ONSET (H): ICD-10-CM

## 2023-10-26 PROCEDURE — G0108 DIAB MANAGE TRN  PER INDIV: HCPCS | Performed by: DIETITIAN, REGISTERED

## 2023-10-26 NOTE — PROGRESS NOTES
DIABETES SELF MANAGEMENT EDUCATION: Initial Assessment Visit for Newly Diagnosed Patients     Usha Wong presents today for education related to Type 2 diabetes.    She is accompanied by self  Referring Provider: Justine Moran CNP. Usha also sees Adali Boston PA-C    DIABETES RELATED CONCERNS/COMMENTS: wonders what kind of diabetes she has  Patient's emotional response to diabetes: expresses readiness to learn    Past Diabetes Education: Newly diagnosed  Support system: family and friend(s)  Living Situation: lives with 2 room mates  Occupation: Student    ASSESSMENT:  Patient Problem List and Medication List reviewed for relevant medical history, current medical status, and diabetes risk factors.    Current Diabetes Management per Patient:  Diabetes medications:   Metformin - currently taking 1000 mg daily, has not increased to 1500 mg yet  Novolog - 1 unit per 15 grams of carbs + correction scale  Basaglar - 10 units daily    At risk for hypoglycemia? Yes   BG meter: One Touch Ultra meter  Patient glucose self monitoring as follows: wearing CGM.   BG results: see blood glucose log attached to this encounter           Patient's most recent A1C is not meeting goal of <7.0    Lab Results   Component Value Date    A1C 10.5 09/30/2023    A1C 5.6 06/13/2018       Nutrition:  Patient eats 2-3 meals per day, might skip breakfast  Breakfast: eggs, turkey garcia, toast  Lunch: sandwich, big batch of stir jack (rice, mushrooms, snap peas, peppers, egg) meat, pasta  Dinner:  Snacks: nuts, hummus + veggie  Beverages: Water, tea, milk  Cultural/Mosque diet restrictions: No   Biggest Challenge to Healthy Eating: knowing what to eat,     Physical Activity:    Going for walks    Diabetes Complications:  Chronic Complication Prevention: discussed goals for diabetes care including A1C, cholesterol, blood pressure, foot care, eye exam, etc.    Diabetes knowledge and skills assessment:     Patient is  "knowledgeable in diabetes management concepts related to: Being Active, Monitoring, Taking Medication, and Reducing Risks  Barriers to Learning Assessment: No Barriers identified    Based on learning assessment above, most appropriate setting for further diabetes education would be: Individual setting.    Instruction and Education Provided during today's visit:  T2DM patient seen for Comprehensive Knowledge Assessment and Instruction and CGM start at the request of Adali Boston PA-C. Usha was able to start FreeStyle Cary 3 sensor on her own. We discussed insertion and removal techniques, when to use a finger-stick to treat, sensor \"lag\" time, and compression lows. Usha had questions about which kind of diabetes she has, discussed that her c-peptide results indicate that she has type 2 diabetes. We talked about how metformin works and how GLP-1 agonists work. She wanted to learn more about Ozempic and Mounjaro and we went over injection technique with demo pens. We also talked about diet - recommended her to try for 45 - 60 grams of carbs at meals and 15 - 30 grams of carbs for snacks. Discussed the relationship between carbohydrate and glucose, identifying carbohydrate containing foods, how to use a nutrition facts label, reviewed written and online or roslyn based resources, how to estimate carbs and how to count carbs when eating out, measuring foods and and estimating portions. Also discussed plate method for planning meals.    Topics that were covered in today's visit:  Basic pathophysiology of T2DM, relationship between carbohydrate intake, release of glucose from the liver, exercise and weight management on glucose control.    Self blood glucose monitoring (SBGM) using the FreeStyle Cary 3.    Target blood glucose for pre-meal and 2 hour post-prandial glucose    Action, timing, and potential side-effects of diabetes medications: Metformin, Basaglar, Novolog,     Basic meal planning using the plate method, " "emphasizing portion control, healthy food choices, and eliminating or minimizing sugary beverages.    Need for consistent physical activity, 30-45 minutes duration, preferably 4-6 days per week.    GLP-1 administration technique taught today. Patient verbalized understanding and was able to perform an accurate return demonstration of administration technique. Side effects were discussed, if patient has any abdominal pain, with or without nausea and/or vomiting, stop medication, call provider, clinic or go to the emergency room.    Discussed concept of \"insulin-stacking\", talked about waiting 4 hours between novolog doses and risk of hypoglycemia    Education Materials Provided:  Healthy Living with Diabetes  Plate Planning Method  Guide to Carbohydrate Counting  Freestyle Cary 3    PLAN:  Patient needs further education on the following diabetes management concepts: Problem Solving    See Patient Instructions for co-developed, patient-stated behavior change goals.  AVS printed and provided to patient today.    FOLLOW-UP:  With Adali Boston PA-C on 11/14  With Elda on 12/7    Time Spent: 60 minutes  Encounter Type: Individual    Usha's answers to questionnaire are below:  SUBJECTIVE/OBJECTIVE:  Diabetes education in the past 24mo: No  Diabetes type: Other  Disease course: Other  How confident are you filling out medical forms by yourself:: Somewhat  Diabetes management related comments/concerns: Wondering about Diet and Information on what possible type I am  Cultural Influences/Ethnic Background:  Not  or     Diabetes Symptoms & Complications:  Fatigue: Yes  Neuropathy: No  Polydipsia: Yes  Polyphagia: Yes  Polyuria: Yes  Visual change: Yes  Slow healing wounds: Yes  Autonomic neuropathy: No  CVA: No  Heart disease: No  Nephropathy: No  Peripheral neuropathy: No  Peripheral Vascular Disease: No  Retinopathy: No  Sexual dysfunction: No    Healthy Eating:  Cultural/Confucianism diet restrictions?: " No  Meal planning/habits: None, Carb counting  How many times a week on average do you eat food made away from home (restaurant/take-out)?: 5+  Meals include: Lunch, Dinner, Evening Snack  Beverages: Water, Tea, Milk    Being Active:  Days per week of moderate to strenuous exercise (like a brisk walk): (P) 5  On average, minutes per day of exercise at this level: (P) 30  How intense was your typical exercise? : (P) Moderate (like brisk walking)  Exercise Minutes per Week: (P) 150  Barrier to exercise: None    Monitoring:  Blood Glucose Meter: FreeStyle  Times checking blood sugar at home (number): 5+  Times checking blood sugar at home (per): Day  Blood glucose trend: Fluctuating      Reducing Risks:  CAD Risks: Diabetes Mellitus, Family history, Sedentary lifestyle, Stress  Has dilated eye exam at least once a year?: No  Sees dentist every 6 months?: Yes  Feet checked by healthcare provider in the last year?: No    Healthy Coping:  Informal Support system:: Family, Friends, Neighbors, Parent      Any diabetes medication dose changes were made via the CDE Protocol and Collaborative Practice Agreement with [facility] [place] and  [facility].  A copy of this encounter was provided to patient's referring provider.

## 2023-10-26 NOTE — PATIENT INSTRUCTIONS
"Matthew Kerr,    It was nice to meet you. Here is a summary of what we discussed:  Consider obiwon or SocialFlow apps to help with carb counting. You can also buy a set of measuring cups at the [employer] to help.  We went over the injection technique for Ozempic and Mounjaro today.    Goals for Diabetes:    1. Check blood sugars at least 4 times each day at varying times: before meals, after meals, and bedtime.    Blood Glucose Targets:   Fasting and before meal: 80 - 130   2 hours after the start of a meal: less than 180      2. Activity helps to improve blood sugars. Try to incorporate 150 minutes per week (at least 10 minute at a time, and at least every other day)    3. Eat three balanced meals each day, consider using plate planning method, aiming for a variety of food groups including 1/4 plate starch/grains, 1/4 plate lean protein, and 1/2 plate non-starchy vegetables. Focus on \"high quality\" carbohydrates (whole grains, starchy vegetables, fruits, beans/legumes). Limit added sugar as much as possible.    4. Take diabetes medications as prescribed   -Metformin  -Basaglar  -Novolog    5. If you are overweight aim for a 5% weight loss. This will improve blood sugars, cholesterol, and blood pressure!     Follow up:   With Carolin Boston on 11/14  With Elda on 12/7    Call or Mychart with any questions.    Elda MOMIN  Diabetes Education  [facility] [address] [address]  Phone: [phone]  Email: [email or web address]            "

## 2023-10-30 ENCOUNTER — OFFICE VISIT (OUTPATIENT)
Dept: OPHTHALMOLOGY | Facility: CLINIC | Age: 23
End: 2023-10-30
Attending: PHYSICIAN ASSISTANT
Payer: COMMERCIAL

## 2023-10-30 DIAGNOSIS — E11.9 TYPE 2 DIABETES MELLITUS WITHOUT RETINOPATHY (H): Primary | ICD-10-CM

## 2023-10-30 PROCEDURE — 92015 DETERMINE REFRACTIVE STATE: CPT

## 2023-10-30 PROCEDURE — 92004 COMPRE OPH EXAM NEW PT 1/>: CPT | Mod: GC | Performed by: OPHTHALMOLOGY

## 2023-10-30 PROCEDURE — G0463 HOSPITAL OUTPT CLINIC VISIT: HCPCS | Performed by: OPHTHALMOLOGY

## 2023-10-30 ASSESSMENT — CONF VISUAL FIELD
OD_INFERIOR_NASAL_RESTRICTION: 0
OD_NORMAL: 1
OS_INFERIOR_NASAL_RESTRICTION: 0
OS_SUPERIOR_NASAL_RESTRICTION: 0
OD_INFERIOR_TEMPORAL_RESTRICTION: 0
OD_SUPERIOR_TEMPORAL_RESTRICTION: 0
OD_SUPERIOR_NASAL_RESTRICTION: 0
OS_SUPERIOR_TEMPORAL_RESTRICTION: 0
METHOD: COUNTING FINGERS
OS_NORMAL: 1
OS_INFERIOR_TEMPORAL_RESTRICTION: 0

## 2023-10-30 ASSESSMENT — TONOMETRY
OD_IOP_MMHG: 16
OS_IOP_MMHG: 17
IOP_METHOD: TONOPEN

## 2023-10-30 ASSESSMENT — REFRACTION_MANIFEST
OD_SPHERE: -1.25
OS_CYLINDER: +0.50
OD_CYLINDER: +0.50
OD_AXIS: 100
OS_AXIS: 015
OS_SPHERE: -1.25

## 2023-10-30 ASSESSMENT — EXTERNAL EXAM - LEFT EYE: OS_EXAM: NORMAL

## 2023-10-30 ASSESSMENT — SLIT LAMP EXAM - LIDS
COMMENTS: NORMAL
COMMENTS: NORMAL

## 2023-10-30 ASSESSMENT — VISUAL ACUITY
OS_SC: 20/50
OD_SC+: -1
METHOD: SNELLEN - LINEAR
OD_SC: 20/40

## 2023-10-30 ASSESSMENT — CUP TO DISC RATIO
OD_RATIO: 0.2
OS_RATIO: 0.2

## 2023-10-30 ASSESSMENT — EXTERNAL EXAM - RIGHT EYE: OD_EXAM: NORMAL

## 2023-10-30 NOTE — PROGRESS NOTES
Chief complaint   Diabetic eye exam    HPI    Usha Wogn 23 year old female here for diabetic eye exam. Was recently diagnosed end of September. She states that over the past 1 year, she has noticed random episodes of blurry vision in both eyes. Now knowing she has diabetes, thinking it may be related to elevated blood sugars as it often occurred after eating.      Chief Complaint(s) and History of Present Illness(es)       Eye Exam For Diabetes    In both eyes.  Charactertized as  fluctuating.  Since onset it is stable.  Associated symptoms include Negative for eye pain, flashes and floaters.             Comments    Usha Wong is a(n) 23 year old female who presents for a diabetic exam. Last eye exam was several year(s) ago. Since exam, vision fluctuates. Uses lubricating drops. No flashes and floaters. No eye pain.     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)     Uncomplicated asthma     Excercise induced       PSH     Past Surgical History:   Procedure Laterality Date    HC TOOTH EXTRACTION W/FORCEP  2017    MYRINGOTOMY, INSERT TUBE BILATERAL, COMBINED  age 2    TONSILLECTOMY  2010    and adenoidectomy       Meds     Current Outpatient Medications   Medication    albuterol (PROAIR HFA/PROVENTIL HFA/VENTOLIN HFA) 108 (90 Base) MCG/ACT Inhaler    blood glucose (NO BRAND SPECIFIED) lancets standard    blood glucose (NO BRAND SPECIFIED)  test strip    blood glucose monitoring (NO BRAND SPECIFIED) meter device kit    Continuous Blood Gluc Sensor (FREESTYLE ERNESTINA 3 SENSOR) MISC    insulin aspart (NOVOLOG PEN) 100 UNIT/ML pen    insulin aspart (NOVOLOG PEN) 100 UNIT/ML pen    insulin aspart (NOVOLOG PEN) 100 UNIT/ML pen    insulin glargine (BASAGLAR KWIKPEN) 100 UNIT/ML pen    insulin pen needle (32G X 4 MM) 32G X 4 MM miscellaneous    metFORMIN (GLUCOPHAGE XR) 500 MG 24 hr tablet    Multiple Vitamins-Minerals (MULTIVITAMIN GUMMIES WOMENS) CHEW    simethicone (MYLICON) 80 MG chewable tablet     No current facility-administered medications for this visit.       Labs     Lab Results   Component Value Date    A1C 10.5 09/30/2023    A1C 5.6 06/13/2018       Imaging   -    Drops Currently Taking   ATs prn    Assessment/Plan   # T2DM without retinopathy  - Last HbA1c 10.5% on 9/30, recent diagnosis  - Discussed importance of good diabetic control and yearly dilated exams    Myopia with astigmatism, both eyes  - Discussed improvement in vision with correction  - New Mrx dispensed today    Follow up: 1 year VTD      Stephanie Ambrose MD  Resident Physician, PGY-3  Department of Ophthalmology    Attending Physician Attestation:  Complete documentation of historical and exam elements from today's encounter can be found in the full encounter summary report (not reduplicated in this progress note).  I personally obtained the chief complaint(s) and history of present illness.  I confirmed and edited as necessary the review of systems, past medical/surgical history, family history, social history, and examination findings as documented by others; and I examined the patient myself.  I personally reviewed the relevant tests, images, and reports as documented above.  I formulated and edited as necessary the assessment and plan and discussed the findings and management plan with the patient and family. - Elida Vo MD

## 2023-10-30 NOTE — NURSING NOTE
Chief Complaints and History of Present Illnesses   Patient presents with    Eye Exam For Diabetes     Chief Complaint(s) and History of Present Illness(es)       Eye Exam For Diabetes              Laterality: both eyes    Quality: fluctuating    Course: stable    Associated symptoms: Negative for eye pain, flashes and floaters              Comments    Usha Wong is a(n) 23 year old female who presents for a diabetic exam. Last eye exam was several year(s) ago. Since exam, vision fluctuates. Uses lubricating drops. No flashes and floaters. No eye pain.     DM2 x1 month  Lab Results       Component                Value               Date                       A1C                      10.5                09/30/2023                 A1C                      5.6                 06/13/2018              Jose Urias COT 9:11 AM October 30, 2023                         Double O-Z Flap Text: The defect edges were debeveled with a #15 scalpel blade.  Given the location of the defect, shape of the defect and the proximity to free margins a Double O-Z flap was deemed most appropriate.  Using a sterile surgical marker, an appropriate transposition flap was drawn incorporating the defect and placing the expected incisions within the relaxed skin tension lines where possible. The area thus outlined was incised deep to adipose tissue with a #15 scalpel blade.  The skin margins were undermined to an appropriate distance in all directions utilizing iris scissors.

## 2023-11-07 NOTE — PROGRESS NOTES
Diabetes Consult Note  November 14, 2023        Usha Wong  is a 23 year old female who has a past medical history of Anxiety, Depression, 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 (H24), and Uncomplicated asthma. She is here for follow-up of diabetes.      Usha was admitted 9/30/2023 with abdominal pain, bloating, hyperglycemia, and glycosuria with concerns of new onset diabetes mellitus. A1C was 10.5. Discharged with Lantus 8 units every morning, carb coverage with insulin aspart 1 unit per 15 g carbs, and low intensity sliding scale insulin at mealtimes.  Discharged with supplies including glucometer, lancets, test strips, needles and wipes.       I first met with Usha in October shortly following her initial hospitalization for new onset diabetes with hyperglycemia.  She had recently been to see her pediatrician and learned that she actually did have an elevated A1c consistent with a diagnosis of type 2 diabetes at 9.0 in 2021, but that she had not been advised of this.      At that visit we continued low-dose Lantus just 10 units daily, started low-dose metformin 500 mg daily, and also started a gentle correction scale of 1 unit per 50 greater than 150 before meals and greater than 200 at bedtime and referred her to diabetes education.  We also did start cari CGMS.    Interim:  Usha met with diabetes education on October 26 and her blood sugar was well managed with an average blood sugar of 155 and no hypoglycemia.  Her blood sugar was in range 87% of the time.  They did work on optimizing use of the cari CGMS.  Reviewed the need for physical activity 30 to 45 minutes daily 4-6 times per week/as well as insulin stacking and guide to carbohydrate counting, and blood sugar goals.          11/14/2023    11:55 AM   including   1. Since your last visit to our clinic (or if this your first visit, since you last saw your primary care provider), have you  experienced any of the following symptoms that may be related to low blood sugars? Sweating that wasn't due to exertion    Shaking or trembling    Extreme hunger    Lightheadedness   2. Since your last visit to our clinic (or if this your first visit, since you last saw your primary care provider), have you experienced any of the following symptoms that may be related to prolonged high blood sugars? More hunger than usual    More thirst than usual    Increased urination    Fatigue   3. Have you had any concerns that you would like to discuss today? Other   4. Do you have any female family members who have had heart attacks or strokes before age 60 or male relatives who have had heart attacks or strokes before age 50? No   5. Do you have any family members who have had complications from diabetes such as kidney disease, heart disease or strokes, retinopathy (a vision problem), or amputations? Yes   6. Who do you live with?  i have two roommates   Little interest or pleasure in doing things? Several days   Feeling down, depressed, or hopeless? Several days   10.  Are you considering a pregnancy or interested in discussing pregnancy prevention today? No          Today:  Usha reports that she is doing alright.  She has a friend who is an EMT and was a counselor at a camp for diabetes and has been helpful and has offered to connect with other folks with diabetes.  She is still interested in GLP-1 agonists, such as Mounjaro. The other two concerns she has is with her Metformin. She has been very slowly increasing her Metformin.  Adding 1 tablet every two weeks.   Initially her stomach would hurt a lot but not as much now taking mostly with dinner.  She also was really nauseas for a while and now has a a large water bottle and that helps.  She brings it everywhere.  She also had a couple of low BG <50.  One time she was bringing things in from the car and she had to sit down and her phone was alerting.  One time it  happened was 6 hours after her last short acting Novolog dose.  No low BG overnight or when awakes in the morning.  In the morning BG usually 115 - 145 or 150.  For short acting tries to keep carbs 30 - 45 g /meal so generally giving 3-4 units Novolog /meal.  A couple of dinners out with friends taking 6 units /dinner, so 9-16 units daily.      FH: No pancreatic or thyroid cancers, no MENs.  No history of pancreatitis.  Not using any alcohol at all.        Current Diabetes Medications:  Metformin 500 mg 1 with lunch or dinner and 2 at dinner.    Lantus 10 units every morning,   carb coverage with insulin aspart 1 unit per 15 g carbs  Aspart 1 unit /100 >140 tid ac and >200 qhs.     We reviewed glucometer/CGMS data together.  It revealed:  Her blood sugar average is 140 which correlates to an estimate a GMI of seven 6.7.  Her blood sugars in range 91% of the time without any hypoglycemia.    Usha's PMH, PSH and allergies were reviewed today and pertinent information is summarized above.  She has a father who was diagnosed with type 2 diabetes at age 50.  She has a cousin with type 1 diabetes.        Current Outpatient Medications   Medication     albuterol (PROAIR HFA/PROVENTIL HFA/VENTOLIN HFA) 108 (90 Base) MCG/ACT Inhaler     blood glucose (NO BRAND SPECIFIED) lancets standard     blood glucose (NO BRAND SPECIFIED) test strip     blood glucose monitoring (NO BRAND SPECIFIED) meter device kit     Continuous Blood Gluc Sensor (FREESTYLE ERNESTINA 3 SENSOR) MISC     insulin aspart (NOVOLOG PEN) 100 UNIT/ML pen     insulin aspart (NOVOLOG PEN) 100 UNIT/ML pen     insulin aspart (NOVOLOG PEN) 100 UNIT/ML pen     insulin glargine (BASAGLAR KWIKPEN) 100 UNIT/ML pen     insulin pen needle (32G X 4 MM) 32G X 4 MM miscellaneous     metFORMIN (GLUCOPHAGE XR) 500 MG 24 hr tablet     Multiple Vitamins-Minerals (MULTIVITAMIN GUMMIES WOMENS) CHEW     simethicone (MYLICON) 80 MG chewable tablet     No current facility-administered  "medications for this visit.         ROS:   Reports good physical activity tolerance.  Denies any pedal lesions or vision changes or concerns. Denies any other acute concerns except as noted above.      Exam:    LMP 10/06/2023 (Exact Date)   Wt Readings from Last 10 Encounters:   10/11/23 101.2 kg (223 lb)   10/02/23 100.8 kg (222 lb 3.2 oz)   01/20/19 113.4 kg (250 lb) (>99%, Z= 2.46)*   11/15/18 116.6 kg (257 lb) (>99%, Z= 2.51)*   06/13/18 114.7 kg (252 lb 12.8 oz) (>99%, Z= 2.50)*   06/08/18 114.3 kg (252 lb) (>99%, Z= 2.50)*   05/24/18 114.5 kg (252 lb 6.4 oz) (>99%, Z= 2.50)*   05/12/18 110.9 kg (244 lb 7.8 oz) (>99%, Z= 2.45)*   05/10/18 108.9 kg (240 lb) (>99%, Z= 2.42)*   04/23/18 103.4 kg (228 lb) (99%, Z= 2.32)*     * Growth percentiles are based on [facility] (Girls, 2-20 Years) data.       General: Pleasant, well nourished and hydrated female in NAD.   Psych:  Mood is \"good,\" affect is appropriate.  Thought form and content are fluid and coherent.    HEENT: Eyes and sclera are clear. Extraocular movements are grossly intact without proptosis.  Nares are patent, mucous membranes moist.  Neck: No masses or JVD are noted.    Resp: Easy and unlabored breathing.   Neuro: Alert and oriented, communicating clearly.      Data:      Recent Labs   Lab Test 10/11/23  0934 10/11/23  0921 10/01/23  0750 09/30/23  0426 09/30/23  0426 01/20/19  1555 06/13/18  1120 05/11/18  0837   A1C  --   --   --   --  10.5*  --  5.6  --    TSH  --  3.03  --   --   --   --   --  1.67   LDL  --  76  --   --   --   --   --  88   HDL  --  30*  --   --   --   --   --  34*   TRIG  --  173*  --   --   --   --   --  296*   CR  --  0.53 0.47*   < > 0.48* 0.58 0.50 0.57   MICROL <12.0  --   --   --   --   --   --   --    AST  --   --   --   --  15 22 17 11   ALT  --   --   --   --  31 34 20 14    < > = values in this interval not displayed.     Her provider advised her that at 22 yo check up A1C was 9.0 at [facility].     Microalbuminuria:  Lab " Results   Component Value Date    UMALCR  10/11/2023      Comment:      Unable to calculate, urine albumin and/or urine creatinine is outside detectable limits.  Microalbuminuria is defined as an albumin:creatinine ratio of 17 to 299 for males and 25 to 299 for females. A ratio of albumin:creatinine of 300 or higher is indicative of overt proteinuria.  Due to biologic variability, positive results should be confirmed by a second, first-morning random or 24-hour timed urine specimen. If there is discrepancy, a third specimen is recommended. When 2 out of 3 results are in the microalbuminuria range, this is evidence for incipient nephropathy and warrants increased efforts at glucose control, blood pressure control, and institution of therapy with an angiotensin-converting-enzyme (ACE) inhibitor (if the patient can tolerate it).         Most recent GFRs:    Lab Results   Component Value Date    GFRESTIMATED >90 10/11/2023    GFRESTIMATED >90 10/01/2023    GFRESTIMATED >90 09/30/2023    GFRESTBLACK GFR not calculated, patient <18 years old. 01/20/2019    GFRESTBLACK >90 06/13/2018    GFRESTBLACK >90 05/11/2018       Lab Results   Component Value Date    CPEPT 4.0 10/11/2023    GADAB <5.0 10/11/2023     FIB-4 Calculation: 0.38 at 10/1/2023  7:50 AM  Calculated from:  SGOT/AST: 15 U/L at 9/30/2023  4:26 AM  SGPT/ALT: 31 U/L at 9/30/2023  4:26 AM  Platelets: 158 10e3/uL at 10/1/2023  7:50 AM  Age: 23 years  AST   Date Value Ref Range Status   09/30/2023 15 0 - 45 U/L Final     Comment:     Reference intervals for this test were updated on 6/12/2023 to more accurately reflect our healthy population. There may be differences in the flagging of prior results with similar values performed with this method. Interpretation of those prior results can be made in the context of the updated reference intervals.   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 Diabetes, currently requiring treatment with insulin:  She also has started metformin and is working to titrate this but is having some GI side effects at 1500 mg daily.  If this does not resolve within the next week and advising her to reduce the dose to just 1000 mg daily.  We also again discussed the risks and benefits of starting a GLP-1 agonist and she would like to start Mounjaro.      Advised:    Please see how next week goes with Metformin.  If symptoms not tolerable, just take 1000 mg daily.  If getting better and tolerable, get a good idea of baseline symptoms before starting Mounjaro 2.5 mg.    Please reduce carb coverage to just 1 unit : 20 g of carb.    When starting Mounjaro, stop giving any carb coverage - just give correction if BG before meal is >140.  If any lows with that, stop that.  If BG average is >160 and having BG often >200, resume carb coverage but just 1 unit: 25 g.   Let me know of ANY low BG <55 or frequent <70.      If fasting BG ever <90, subtract 1 unit from Lantus dose.      I have written a prescription for Mounjaro for 2 months at 2.5 mg.  If she is tolerating this at 4 weeks we could redate the 5 mg dose for her to start this sooner.  She will be seeing Elda and follow-up at diabetes education early next month.  I will see her back in 3 months.      She also is requesting a letter for school today.  I will ask the nursing compose and send this.          2. DM Complications-     Lifestyle modification education provided.      Retinopathy:   She did have a baseline eye exam done.  No retinopathy.  Nephropathy:   No  "microalbuminuria was negative.  Neuropathy: Denies - needs foot exam.  Feet: OK, denies ulcers or lesions.   Lipids: 10-year atherosclerotic cardiovascular disease risk <20%, age <40. Patient not taking statin.  Statin is contraindicated as she is a woman of childbearing age.      45 minutes spent on the date of the encounter doing chart review, history and exam, documentation, education and counseling, as well as communication and coordination of care, and further activities as noted above.  It is my privilege to be involved in the care of the above patient.     Adali Boston PA-C, MPAS  [facility]  Diabetes, Endocrinology, and Metabolism  [phone] Appointments/Nurse  [phone] pager  [phone] nurse line    This note was completed in part using Dragon voice recognition, and may contain word and grammatical errors.    Joined the call at 11/14/2023, 12:29:46 pm.  Left the call at 11/14/2023, 1:09:01 pm.  You were on the call for 39 minutes 15 seconds .  Patient at home provider off-site.  Platform [employer].        Outcome for 10/04/23 12:27 PM: Glucose readings are under \"labs\" from patients recent hospitalization.   Scarlet Salas LPN   Outcome for 10/04/23 2:33 PM: Left Voicemail   Scarlet Salas LPN   Outcome for 10/05/23 8:46 AM: Data obtained via phone and located below  Scarlet Salas LPN         10/2  6:39 PM- 183  8:16 PM- 260  9:22 PM- 230  Bedtime gave 2 units    10/3  7:19 AM- 262  8:42 AM- 344  10:03 AM- 265  12 PM- 229  1:41 PM -209  2:46 PM- 304  3:59 PM- 260  6:44 PM- 191  11:20 PM- 231     - gave 6 units at breakfast, lunch 60g carbs  5 units, dinner 60 g 5 units, bedtime 1 unit - 17 units     10/4  11 AM- 236  1 PM- 267  5:39 PM- 148  8:09 PM- 187  10:21 PM- 183    10/5  12 AM- 215  2:19 AM- 204  2:20 AM- 235  2:21 AM- 224  191  3 club crackers and cheese stick    10:30 157   - gave 4 units with lunch and dinner breakfast 5 units - 13 units TDD Novolog  10/6  Am - " 181    Outcome for 11/07/23 2:14 PM: Mychart message sent  Scarlet Salas LPN   Outcome for 11/10/23 3:29 PM: Left Voicemail   Zora Neves MA  Outcome for 11/13/23 11:55 AM: Left Voicemail   Scarlet Salas LPN   Outcome for 11/14/23 12:01 PM: Data obtained via [facility] website (10/22/23-11/4/23)  Zora Neves MA  Outcome for 11/14/23 12:01 PM: Data obtained via phone and located below  Zora Neves MA    11/4/23  12:30am: 117  7:44pm: 118    11/5/23  12:18am: 140  2:56pm: 178    11/6/23  4:04am: 178  3:54pm: 130  9:04pm: 128    11/7/23  9:41am: 141  12:19pm: 149  3:28pm: 175  3:36pm: 97  11:01pm: 129    11/8/23  12:19pm: 141  6:19pm: 103  7:13pm: 134  8:11pm: 144    11/9/23  9:28pm: 97    11/10/23  3:01pm: 139  6:40pm: 108  8:15pm: 113  9:56pm: 119    11/11/23  11:58am: 150  2:53pm: 145  10:45pm: 135    11/12/23  1:43pm: 148  6:05pm: 132  10:00pm: 258  10:33pm: 258    11/13/23  3:39pm: 225  11:34pm: 150    11/14/23  10:49am: 148

## 2023-11-10 ENCOUNTER — TELEPHONE (OUTPATIENT)
Dept: ENDOCRINOLOGY | Facility: CLINIC | Age: 23
End: 2023-11-10
Payer: COMMERCIAL

## 2023-11-10 NOTE — TELEPHONE ENCOUNTER
Called patient and left voicemail. Patient has an appointment on  11/14/23 . Need patient to upload their [employer] device to site for provider to review prior to their appointment.  Zora Neves MA

## 2023-11-13 NOTE — TELEPHONE ENCOUNTER
Called patient and left voicemail. Patient has an appointment on 11/14/2023. Need patient to upload their [employer] device to site for provider to review prior to their appointment.    Scarlet Salas LPN 11/13/23 11:55 AM

## 2023-11-14 ENCOUNTER — VIRTUAL VISIT (OUTPATIENT)
Dept: ENDOCRINOLOGY | Facility: CLINIC | Age: 23
End: 2023-11-14
Payer: COMMERCIAL

## 2023-11-14 ENCOUNTER — TELEPHONE (OUTPATIENT)
Dept: ENDOCRINOLOGY | Facility: CLINIC | Age: 23
End: 2023-11-14

## 2023-11-14 DIAGNOSIS — Z79.4 INSULIN-REQUIRING OR DEPENDENT TYPE II DIABETES MELLITUS (H): Primary | ICD-10-CM

## 2023-11-14 DIAGNOSIS — E11.9 INSULIN-REQUIRING OR DEPENDENT TYPE II DIABETES MELLITUS (H): Primary | ICD-10-CM

## 2023-11-14 PROCEDURE — 99215 OFFICE O/P EST HI 40 MIN: CPT | Mod: VID | Performed by: PHYSICIAN ASSISTANT

## 2023-11-14 NOTE — TELEPHONE ENCOUNTER
Prior Authorization Approval    Medication: MOUNJARO 2.5 MG/0.5ML SC SOPN  Authorization Effective Date: 10/15/2023  Authorization Expiration Date: 11/13/2026  Approved Dose/Quantity: 2ml per 28 days  Reference #: Key: [id number]   Insurance Company: [employer] - Phone [phone] Fax [phone]  Expected CoPay: $ 25  CoPay Card Available:      Financial Assistance Needed: no  Which Pharmacy is filling the prescription: [facility]Y #[id number] - [place], [place] - [address] [place] [address]  Pharmacy Notified: yes  Patient Notified: no

## 2023-11-14 NOTE — NURSING NOTE
Is the patient currently in the state of [place]? YES    Visit mode:VIDEO    If the visit is dropped, the patient can be reconnected by: VIDEO VISIT: Send to e-mail at: [email or web address]    Will anyone else be joining the visit? NO  (If patient encounters technical issues they should call [phone]    How would you like to obtain your AVS? MyChart    Are changes needed to the allergy or medication list? Pt stated no changes to allergies and Pt stated no med changes    Reason for visit: BECCA Neves CMA VVF

## 2023-11-14 NOTE — PATIENT INSTRUCTIONS
Dear Usha,  Please see how next week goes with Metformin.  If symptoms not tolerable, just take 1000 mg daily.  If getting better and tolerable, get a good idea of baseline symptoms before starting Mounjaro 2.5 mg.    Please reduce carb coverage to just 1 unit : 20 g of carb.    When starting Mounjaro, stop giving any carb coverage - just give correction if BG before meal is >140.  If any lows with that, stop that.  If BG average is >160 and having BG often >200, resume carb coverage but just 1 unit: 25 g.   Let me know of ANY low BG <55 or frequent <70.      If fasting BG ever <90, subtract 1 unit from Lantus dose.      My best wishes,    Adali Boston PA-C, MPAS  [facility]  Diabetes, Endocrinology, and Metabolism  [phone] Appointments/Nurse  [phone] Fax

## 2023-11-14 NOTE — LETTER
11/14/2023       RE: Usha Wong  [address]  [facility] [address]     Dear Colleague,    Thank you for referring your patient, Usha Wong, to the [facility]OCRINOLOG[facility] [place] [facility]. Please see a copy of my visit note below.             Diabetes Consult Note  November 14, 2023        Usha Wong  is a 23 year old female who has a past medical history of Anxiety, Depression, 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 (H24), and Uncomplicated asthma. She is here for follow-up of diabetes.      Usha was admitted 9/30/2023 with abdominal pain, bloating, hyperglycemia, and glycosuria with concerns of new onset diabetes mellitus. A1C was 10.5. Discharged with Lantus 8 units every morning, carb coverage with insulin aspart 1 unit per 15 g carbs, and low intensity sliding scale insulin at mealtimes.  Discharged with supplies including glucometer, lancets, test strips, needles and wipes.       I first met with Usha in October shortly following her initial hospitalization for new onset diabetes with hyperglycemia.  She had recently been to see her pediatrician and learned that she actually did have an elevated A1c consistent with a diagnosis of type 2 diabetes at 9.0 in 2021, but that she had not been advised of this.      At that visit we continued low-dose Lantus just 10 units daily, started low-dose metformin 500 mg daily, and also started a gentle correction scale of 1 unit per 50 greater than 150 before meals and greater than 200 at bedtime and referred her to diabetes education.  We also did start cari CGMS.    Interim:  Usha met with diabetes education on October 26 and her blood sugar was well managed with an average blood sugar of 155 and no hypoglycemia.  Her blood sugar was in range 87% of the time.  They did work on optimizing use of the cari CGMS.   Reviewed the need for physical activity 30 to 45 minutes daily 4-6 times per week/as well as insulin stacking and guide to carbohydrate counting, and blood sugar goals.          11/14/2023    11:55 AM   including   1. Since your last visit to our clinic (or if this your first visit, since you last saw your primary care provider), have you experienced any of the following symptoms that may be related to low blood sugars? Sweating that wasn't due to exertion    Shaking or trembling    Extreme hunger    Lightheadedness   2. Since your last visit to our clinic (or if this your first visit, since you last saw your primary care provider), have you experienced any of the following symptoms that may be related to prolonged high blood sugars? More hunger than usual    More thirst than usual    Increased urination    Fatigue   3. Have you had any concerns that you would like to discuss today? Other   4. Do you have any female family members who have had heart attacks or strokes before age 60 or male relatives who have had heart attacks or strokes before age 50? No   5. Do you have any family members who have had complications from diabetes such as kidney disease, heart disease or strokes, retinopathy (a vision problem), or amputations? Yes   6. Who do you live with?  i have two roommates   Little interest or pleasure in doing things? Several days   Feeling down, depressed, or hopeless? Several days   10.  Are you considering a pregnancy or interested in discussing pregnancy prevention today? No          Today:  Usha reports that she is doing alright.  She has a friend who is an EMT and was a counselor at a camp for diabetes and has been helpful and has offered to connect with other folks with diabetes.  She is still interested in GLP-1 agonists, such as Mounjaro. The other two concerns she has is with her Metformin. She has been very slowly increasing her Metformin.  Adding 1 tablet every two weeks.   Initially her stomach  would hurt a lot but not as much now taking mostly with dinner.  She also was really nauseas for a while and now has a a large water bottle and that helps.  She brings it everywhere.  She also had a couple of low BG <50.  One time she was bringing things in from the car and she had to sit down and her phone was alerting.  One time it happened was 6 hours after her last short acting Novolog dose.  No low BG overnight or when awakes in the morning.  In the morning BG usually 115 - 145 or 150.  For short acting tries to keep carbs 30 - 45 g /meal so generally giving 3-4 units Novolog /meal.  A couple of dinners out with friends taking 6 units /dinner, so 9-16 units daily.      FH: No pancreatic or thyroid cancers, no MENs.  No history of pancreatitis.  Not using any alcohol at all.        Current Diabetes Medications:  Metformin 500 mg 1 with lunch or dinner and 2 at dinner.    Lantus 10 units every morning,   carb coverage with insulin aspart 1 unit per 15 g carbs  Aspart 1 unit /100 >140 tid ac and >200 qhs.     We reviewed glucometer/CGMS data together.  It revealed:  Her blood sugar average is 140 which correlates to an estimate a GMI of seven 6.7.  Her blood sugars in range 91% of the time without any hypoglycemia.    Usha's PMH, PSH and allergies were reviewed today and pertinent information is summarized above.  She has a father who was diagnosed with type 2 diabetes at age 50.  She has a cousin with type 1 diabetes.        Current Outpatient Medications   Medication    albuterol (PROAIR HFA/PROVENTIL HFA/VENTOLIN HFA) 108 (90 Base) MCG/ACT Inhaler    blood glucose (NO BRAND SPECIFIED) lancets standard    blood glucose (NO BRAND SPECIFIED) test strip    blood glucose monitoring (NO BRAND SPECIFIED) meter device kit    Continuous Blood Gluc Sensor (BibuluSTYLE ERNESTINA 3 SENSOR) MISC    insulin aspart (NOVOLOG PEN) 100 UNIT/ML pen    insulin aspart (NOVOLOG PEN) 100 UNIT/ML pen    insulin aspart (NOVOLOG PEN) 100  "UNIT/ML pen    insulin glargine (BASAGLAR KWIKPEN) 100 UNIT/ML pen    insulin pen needle (32G X 4 MM) 32G X 4 MM miscellaneous    metFORMIN (GLUCOPHAGE XR) 500 MG 24 hr tablet    Multiple Vitamins-Minerals (MULTIVITAMIN GUMMIES WOMENS) CHEW    simethicone (MYLICON) 80 MG chewable tablet     No current facility-administered medications for this visit.         ROS:   Reports good physical activity tolerance.  Denies any pedal lesions or vision changes or concerns. Denies any other acute concerns except as noted above.      Exam:    LMP 10/06/2023 (Exact Date)   Wt Readings from Last 10 Encounters:   10/11/23 101.2 kg (223 lb)   10/02/23 100.8 kg (222 lb 3.2 oz)   01/20/19 113.4 kg (250 lb) (>99%, Z= 2.46)*   11/15/18 116.6 kg (257 lb) (>99%, Z= 2.51)*   06/13/18 114.7 kg (252 lb 12.8 oz) (>99%, Z= 2.50)*   06/08/18 114.3 kg (252 lb) (>99%, Z= 2.50)*   05/24/18 114.5 kg (252 lb 6.4 oz) (>99%, Z= 2.50)*   05/12/18 110.9 kg (244 lb 7.8 oz) (>99%, Z= 2.45)*   05/10/18 108.9 kg (240 lb) (>99%, Z= 2.42)*   04/23/18 103.4 kg (228 lb) (99%, Z= 2.32)*     * Growth percentiles are based on CDC (Girls, 2-20 Years) data.       General: Pleasant, well nourished and hydrated female in NAD.   Psych:  Mood is \"good,\" affect is appropriate.  Thought form and content are fluid and coherent.    HEENT: Eyes and sclera are clear. Extraocular movements are grossly intact without proptosis.  Nares are patent, mucous membranes moist.  Neck: No masses or JVD are noted.    Resp: Easy and unlabored breathing.   Neuro: Alert and oriented, communicating clearly.      Data:      Recent Labs   Lab Test 10/11/23  0934 10/11/23  0921 10/01/23  0750 09/30/23  0426 09/30/23  0426 01/20/19  1555 06/13/18  1120 05/11/18  0837   A1C  --   --   --   --  10.5*  --  5.6  --    TSH  --  3.03  --   --   --   --   --  1.67   LDL  --  76  --   --   --   --   --  88   HDL  --  30*  --   --   --   --   --  34*   TRIG  --  173*  --   --   --   --   --  296*   CR  " --  0.53 0.47*   < > 0.48* 0.58 0.50 0.57   MICROL <12.0  --   --   --   --   --   --   --    AST  --   --   --   --  15 22 17 11   ALT  --   --   --   --  31 34 20 14    < > = values in this interval not displayed.     Her provider advised her that at 22 yo check up A1C was 9.0 at [facility].     Microalbuminuria:  Lab Results   Component Value Date    UMALCR  10/11/2023      Comment:      Unable to calculate, urine albumin and/or urine creatinine is outside detectable limits.  Microalbuminuria is defined as an albumin:creatinine ratio of 17 to 299 for males and 25 to 299 for females. A ratio of albumin:creatinine of 300 or higher is indicative of overt proteinuria.  Due to biologic variability, positive results should be confirmed by a second, first-morning random or 24-hour timed urine specimen. If there is discrepancy, a third specimen is recommended. When 2 out of 3 results are in the microalbuminuria range, this is evidence for incipient nephropathy and warrants increased efforts at glucose control, blood pressure control, and institution of therapy with an angiotensin-converting-enzyme (ACE) inhibitor (if the patient can tolerate it).         Most recent GFRs:    Lab Results   Component Value Date    GFRESTIMATED >90 10/11/2023    GFRESTIMATED >90 10/01/2023    GFRESTIMATED >90 09/30/2023    GFRESTBLACK GFR not calculated, patient <18 years old. 01/20/2019    GFRESTBLACK >90 06/13/2018    GFRESTBLACK >90 05/11/2018       Lab Results   Component Value Date    CPEPT 4.0 10/11/2023    GADAB <5.0 10/11/2023     FIB-4 Calculation: 0.38 at 10/1/2023  7:50 AM  Calculated from:  SGOT/AST: 15 U/L at 9/30/2023  4:26 AM  SGPT/ALT: 31 U/L at 9/30/2023  4:26 AM  Platelets: 158 10e3/uL at 10/1/2023  7:50 AM  Age: 23 years  AST   Date Value Ref Range Status   09/30/2023 15 0 - 45 U/L Final     Comment:     Reference intervals for this test were updated on 6/12/2023 to more accurately reflect our healthy population. There  may be differences in the flagging of prior results with similar values performed with this method. Interpretation of those prior results can be made in the context of the updated reference intervals.   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  If this does not resolve within the next week and advising her to reduce the dose to just 1000 mg daily.  We also again discussed the risks and benefits of starting a GLP-1 agonist and she would like to start Mounjaro.      Advised:    Please see how next week goes with Metformin.  If symptoms not tolerable, just take 1000 mg daily.  If getting better and tolerable, get a good idea of baseline symptoms before starting Mounjaro 2.5 mg.    Please reduce carb coverage to just 1 unit : 20 g of carb.    When starting Mounjaro, stop giving any carb coverage - just give correction if BG before meal is >140.  If any lows with that, stop that.  If BG average is >160 and having BG often >200, resume carb coverage but just 1 unit: 25 g.   Let me know of ANY low BG <55 or frequent <70.      If fasting BG ever <90, subtract 1 unit from Lantus dose.      I have written a prescription for Mounjaro for 2 months at 2.5 mg.  If she is tolerating this at 4 weeks we could redate the 5 mg dose for her to start this sooner.  She will be seeing Elda and follow-up at diabetes education early next month.  I will  "see her back in 3 months.      She also is requesting a letter for school today.  I will ask the nursing compose and send this.          2. DM Complications-     Lifestyle modification education provided.      Retinopathy:   She did have a baseline eye exam done.  No retinopathy.  Nephropathy:   No microalbuminuria was negative.  Neuropathy: Denies - needs foot exam.  Feet: OK, denies ulcers or lesions.   Lipids: 10-year atherosclerotic cardiovascular disease risk <20%, age <40. Patient not taking statin.  Statin is contraindicated as she is a woman of childbearing age.      45 minutes spent on the date of the encounter doing chart review, history and exam, documentation, education and counseling, as well as communication and coordination of care, and further activities as noted above.  It is my privilege to be involved in the care of the above patient.     Adali Boston PA-C, [facility]  [facility]  Diabetes, Endocrinology, and Metabolism  [phone] Appointments/Nurse  [phone] pager  [phone] nurse line    This note was completed in part using Dragon voice recognition, and may contain word and grammatical errors.    Joined the call at 11/14/2023, 12:29:46 pm.  Left the call at 11/14/2023, 1:09:01 pm.  You were on the call for 39 minutes 15 seconds .  Patient at home provider off-site.  [employer].        Outcome for 10/04/23 12:27 PM: Glucose readings are under \"labs\" from patients recent hospitalization.   Scarlet Salas LPN   Outcome for 10/04/23 2:33 PM: Left Voicemail   Scarlet Salas LPN   Outcome for 10/05/23 8:46 AM: Data obtained via phone and located below  Scarlet Salas LPN         10/2  6:39 PM- 183  8:16 PM- 260  9:22 PM- 230  Bedtime gave 2 units    10/3  7:19 AM- 262  8:42 AM- 344  10:03 AM- 265  12 PM- 229  1:41 PM -209  2:46 PM- 304  3:59 PM- 260  6:44 PM- 191  11:20 PM- 231     - gave 6 units at breakfast, lunch 60g carbs  5 units, dinner 60 g 5 units, bedtime 1 unit - " 17 units     10/4  11 AM- 236  1 PM- 267  5:39 PM- 148  8:09 PM- 187  10:21 PM- 183    10/5  12 AM- 215  2:19 AM- 204  2:20 AM- 235  2:21 AM- 224  191  3 club crackers and cheese stick    10:30 157   - gave 4 units with lunch and dinner breakfast 5 units - 13 units TDD Novolog  10/6  Am - 181    Outcome for 11/07/23 2:14 PM: [employer] Assistant[employer] message sent  Scarlet Salas LPN   Outcome for 11/10/23 3:29 PM: Left Voicemail   Zora Neves MA  Outcome for 11/13/23 11:55 AM: Left Voicemail   Scarlet Salas LPN   Outcome for 11/14/23 12:01 PM: Data obtained via [employer] website (10/22/23-11/4/23)  Zora Neves MA  Outcome for 11/14/23 12:01 PM: Data obtained via phone and located below  Zora Neves MA    11/4/23  12:30am: 117  7:44pm: 118    11/5/23  12:18am: 140  2:56pm: 178    11/6/23  4:04am: 178  3:54pm: 130  9:04pm: 128    11/7/23  9:41am: 141  12:19pm: 149  3:28pm: 175  3:36pm: 97  11:01pm: 129    11/8/23  12:19pm: 141  6:19pm: 103  7:13pm: 134  8:11pm: 144    11/9/23  9:28pm: 97    11/10/23  3:01pm: 139  6:40pm: 108  8:15pm: 113  9:56pm: 119    11/11/23  11:58am: 150  2:53pm: 145  10:45pm: 135    11/12/23  1:43pm: 148  6:05pm: 132  10:00pm: 258  10:33pm: 258    11/13/23  3:39pm: 225  11:34pm: 150    11/14/23  10:49am: 148

## 2023-11-16 ENCOUNTER — TELEPHONE (OUTPATIENT)
Dept: ENDOCRINOLOGY | Facility: CLINIC | Age: 23
End: 2023-11-16
Payer: COMMERCIAL

## 2023-11-16 NOTE — TELEPHONE ENCOUNTER
MTM referral from: [facility] visit (referral by provider)    MTM referral outreach attempt #2 on November 16, 2023 at 2:16 PM      Outcome: Patient not reachable after several attempts, will route to MT Pharmacist/Provider as an FYI.  [facility] scheduling number is .  Thank you for the referral.    Use   mehdiare indv and fam    for the carrier/Plan on the flowsheet      [employer] Message Sent    Debi Wasserman - [facility]

## 2023-11-17 ENCOUNTER — TELEPHONE (OUTPATIENT)
Dept: ENDOCRINOLOGY | Facility: CLINIC | Age: 23
End: 2023-11-17
Payer: COMMERCIAL

## 2023-11-17 NOTE — TELEPHONE ENCOUNTER
Patient call:     Appointment type: return diabetes   Provider: Darvin   Return date: Feb 2024  Speciality phone number: [phone]  Additional appointment(s) needed:   Additional notes: LVM and jose Samuels on 11/17/2023 at 5:27 PM

## 2023-11-21 ENCOUNTER — MYC MEDICAL ADVICE (OUTPATIENT)
Dept: ENDOCRINOLOGY | Facility: CLINIC | Age: 23
End: 2023-11-21
Payer: COMMERCIAL

## 2023-11-21 NOTE — TELEPHONE ENCOUNTER
Patient call:      Appointment type: return diabetes   Provider: Darvin   Return date: Feb 2024  Speciality phone number: [phone]  Additional appointment(s) needed:   Additional notes: LVM and sent myc x2

## 2023-11-24 NOTE — TELEPHONE ENCOUNTER
Sent [employer] message to schedule patient. This is the 3rd and final attempt.    Please have patient call our scheduling department at [phone] to schedule an MTM visit if patient is still interested.    Thank you!     Denis Correia, PharmD, [facility]  Endocrine & Diabetes MTM Pharmacist  [address] [place] [id number]  Direct Voicemail: [phone]

## 2023-11-25 ENCOUNTER — HEALTH MAINTENANCE LETTER (OUTPATIENT)
Age: 23
End: 2023-11-25

## 2023-12-07 ENCOUNTER — NURSE TRIAGE (OUTPATIENT)
Dept: FAMILY MEDICINE | Facility: CLINIC | Age: 23
End: 2023-12-07

## 2023-12-07 ENCOUNTER — ALLIED HEALTH/NURSE VISIT (OUTPATIENT)
Dept: EDUCATION SERVICES | Facility: CLINIC | Age: 23
End: 2023-12-07
Payer: COMMERCIAL

## 2023-12-07 DIAGNOSIS — E11.9 DIABETES MELLITUS, NEW ONSET (H): ICD-10-CM

## 2023-12-07 PROCEDURE — G0108 DIAB MANAGE TRN  PER INDIV: HCPCS | Performed by: DIETITIAN, REGISTERED
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
## 2023-12-07 NOTE — PATIENT INSTRUCTIONS
Matthew Kerr,    It was nice seeing you today, you're doing a great job at managing your diabetes! Keep it up.    Plan:  When starting Mounjaro, stop giving any carb coverage - just give correction if BG before meal is >140.  If any lows with that, stop that.  If BG average is >160 and having BG often >200, resume carb coverage but just 1 unit: 25 g.   Let us know of ANY low BG <55 or frequent <70.     If fasting BG ever <90, subtract 1 unit from Lantus dose.    When starting Mounjaro, cut your portions in half,  listen to your body.  Stop eating as soon as you feel full.   Call or send [employer] message if you have side effects, nausea, or hypoglycemia.   Let us know if you're having issues picking up Mounjaro, we can always send it to another pharmacy.  [facility] [employer] (replacements)  Phone: [phone]    Follow-up  With Elda on 12/28 at 9am to see how things are going with Mounjaro  Follow-up with Adali around February, to be scheduled.      Elda MOMIN  Diabetes Education  [facility]  [address] [place] [id number]  Phone: [phone]  Email: [email or web address]

## 2023-12-07 NOTE — PROGRESS NOTES
Diabetes Self-Management Education & Support    Usha Wong presents today for education related to Type 2 diabetes    Patient is being treated with:  oral agents, insulin, and Mounjaro (will start today)  She is accompanied by self    Year of diagnosis: 2021  Referring provider:  Adali Boston PA-C  Living Situation: has roommates  Employment: student    PATIENT CONCERNS RELATED TO DIABETES SELF MANAGEMENT: is experiencing GI side effects from metformin when taking three 500 mg pills a day, has not started Mounjaro yet, fell 2 weeks ago and got a concussion, wonders if it will affect with Mounjaro        Monitoring:  Patient glucose self monitoring as follows: continuously using a continuous glucose monitor (CGM), wearing Cary 3 sensor  BG results:                 Taking Medications:  Diabetes Medication(s)       Biguanides       metFORMIN (GLUCOPHAGE XR) 500 MG 24 hr tablet    Take 3 tablets (1,500 mg) by mouth daily (with dinner)      Insulin       insulin aspart (NOVOLOG PEN) 100 UNIT/ML pen    Inject 1-3 Units Subcutaneous 3 times daily (before meals) Correction Scale - LOW INSULIN RESISTANCE DOSING   Do Not give Correction Insulin if Pre-Meal BG less than 140. For Pre-Meal  - 239 give 1 unit. For Pre-Meal  - 339 give 2 units. For Pre-Meal BG greater than or equal to 340 give 3 units. To be given with prandial insulin, and based on pre-meal blood glucose. Notify provider if glucose greater than or equal to 350 mg/dL after administration of correction dose. If given at mealtime, administer within 30 minutes of start of meal.     insulin aspart (NOVOLOG PEN) 100 UNIT/ML pen    Inject 1-3 Units Subcutaneous At Bedtime LOW INSULIN RESISTANCE DOSING  Do Not give Bedtime Correction Insulin if BG less than 200. For  - 299 give 1 unit. For  - 399 give 2 units For BG greater than or equal 400 give 3 units. Notify provider if glucose greater than or equal to 350 mg/dL after  administration of correction dose. If given at mealtime, administer within 30 minutes of start of meal.     insulin aspart (NOVOLOG PEN) 100 UNIT/ML pen    Dose = 1 units per 15 grams of carbohydate. If given at mealtime, administer within 30 minutes of start of meal.     insulin glargine (BASAGLAR KWIKPEN) 100 UNIT/ML pen    Inject 8 Units Subcutaneous every morning      Incretin Mimetic Agents       tirzepatide (MOUNJARO) 2.5 MG/0.5ML pen    Inject 2.5 mg Subcutaneous every 7 days     tirzepatide (MOUNJARO) 5 MG/0.5ML pen    Inject 5 mg Subcutaneous every 7 days If tolerating 2.5 mg                 Problem Solving:  Had one episode of hypoglycemia when out with friends last month  Patient is at risk of hypoglycemia?: YES  Hospitalizations for hyper or hypoglycemia: Yes (Please explain): at diagnosis, 9/30 - 10/2    Reducing Risks:  Diabetes Symptoms & Complications:  Dicussed goals for diabetes care today, Usha went for her eye exam and said they detected changes in her retina.  We also discussed A1C goal, Blood pressure, Cholesterol, and Urine albumin goals.      Patient's most recent   Lab Results   Component Value Date    A1C 10.5 09/30/2023    A1C 5.6 06/13/2018      Patient's A1C goal: <7.0    Being Active:  She is a student  Goes for walks    Nutrition:     Is getting burn-out from counting carbs all the time, especially when going out to eat. Trying [employer] meal subscription and appreciates that meals come with nutrition facts. Knows to check restaurant websites for nutrition facts and will ask for substitutions when ordering a resturants (e.g. steamed veggies instead of french fries)       Healthy Coping:  Not assessed today      EDUCATION and INSTRUCTION PROVIDED AT THIS VISIT:    T2DM seen for follow-up. She was prescribed Mounjaro but has not started it yet as she was experiencing GI side effects from Metformin. She is unable to tolerate 1500 mg dose and is currently taking 1000 mg daily. She is  already prescribed Metformin ER. She is doing well overall, has final projects due next week for the semester. She is studying art.   We discussed what to expect when starting Mounjaro, Instructed in the action of Mounjaro, including timing and desired effect, including feeling dueñas faster.  Side effects were discussed, if patient has any abdominal pain, with or without nausea and/or vomiting, stop GLP-1, and call provider/clinic for advice.  Cary 3 data reviewed, Time in Range is 99%, 1% highs. GMI is 6.0, average glucose is 114 and variability of 19.7%. Numbers in target range throughout the day. Occasional lows have occurred where she has felt symptoms and was able to treat with quick-acting carbs.   She continues on 10 unit(s) of Basaglar daily and is taking Novolog using ICR of 1u:20 grams CHO and correction scale. She is learning how to dose her insulin based on her foods. Overall Usha's glucose numbers have improved significantly.  We discussed stopping Novolog carb coverage once she starts Mounjaro per Adali's recommendations. She is only to use Novolog for correction if needed.   Discussed that her doses may need to be adjusted further as the dose of Mounjaro increases.      Plan:  When starting Mounjaro, stop giving any carb coverage - just give correction if BG before meal is >140.  If any lows with that, stop that.  If BG average is >160 and having BG often >200, resume carb coverage but just 1 unit: 25 g.   Let us know of ANY low BG <55 or frequent <70.     If fasting BG ever <90, subtract 1 unit from Basaglar dose.    When starting Mounjaro, cut your portions in half,  listen to your body.  Stop eating as soon as you feel full.   Call or send [employer] message if you have side effects, nausea, or hypoglycemia.   Let us know if you're having issues picking up Mounjaro, we can always send it to another pharmacy.  [facility] ([employer])  Phone: [phone]    Follow-up  With Elda mclain  12/28 at 9am to see how things are going with Mounjaro  Follow-up with Adali Boston PA-C in February      Time spent with patient at today's visit was 45 minutes.      Any diabetes medication dose changes were made via the CDE Protocol and Collaborative Practice Agreement with [facility] and  [facility].  A copy of this encounter was provided to patient's referring provider.

## 2023-12-08 ENCOUNTER — HOSPITAL ENCOUNTER (EMERGENCY)
Facility: CLINIC | Age: 23
Discharge: HOME | End: 2023-12-08
Attending: EMERGENCY MEDICINE | Admitting: EMERGENCY MEDICINE

## 2023-12-08 ENCOUNTER — APPOINTMENT (OUTPATIENT)
Dept: GENERAL RADIOLOGY | Facility: CLINIC | Age: 23
End: 2023-12-08
Attending: EMERGENCY MEDICINE

## 2023-12-08 VITALS
DIASTOLIC BLOOD PRESSURE: 89 MMHG | SYSTOLIC BLOOD PRESSURE: 146 MMHG | OXYGEN SATURATION: 100 % | RESPIRATION RATE: 16 BRPM | HEART RATE: 92 BPM | TEMPERATURE: 98 F

## 2023-12-08 DIAGNOSIS — S20.212A CHEST WALL CONTUSION, LEFT, INITIAL ENCOUNTER: ICD-10-CM

## 2023-12-08 DIAGNOSIS — S16.1XXA STRAIN OF NECK MUSCLE, INITIAL ENCOUNTER: ICD-10-CM

## 2023-12-08 DIAGNOSIS — S30.1XXA CONTUSION OF ABDOMINAL WALL, INITIAL ENCOUNTER: ICD-10-CM

## 2023-12-08 PROCEDURE — 72040 X-RAY EXAM NECK SPINE 2-3 VW: CPT

## 2023-12-08 PROCEDURE — 99283 EMERGENCY DEPT VISIT LOW MDM: CPT

## 2023-12-08 ASSESSMENT — ACTIVITIES OF DAILY LIVING (ADL): ADLS_ACUITY_SCORE: 35
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
## 2023-12-28 ENCOUNTER — VIRTUAL VISIT (OUTPATIENT)
Dept: EDUCATION SERVICES | Facility: CLINIC | Age: 23
End: 2023-12-28
Payer: COMMERCIAL

## 2023-12-28 DIAGNOSIS — E11.9 DIABETES MELLITUS, NEW ONSET (H): Primary | ICD-10-CM

## 2023-12-28 PROCEDURE — G0108 DIAB MANAGE TRN  PER INDIV: HCPCS | Mod: VID | Performed by: DIETITIAN, REGISTERED

## 2023-12-28 NOTE — NURSING NOTE
Is the patient currently in the state of [place]? YES    Visit mode:VIDEO    If the visit is dropped, the patient can be reconnected by: VIDEO VISIT: Send to e-mail at: [email or web address]    Will anyone else be joining the visit? NO  (If patient encounters technical issues they should call [phone])    How would you like to obtain your AVS? MyChart    Are changes needed to the allergy or medication list? No patient reports no changes to her e-check since it was completed prior to visit. VF did not review information again with patient.    Reason for visit: RECHECK    Angelica JEFF

## 2023-12-28 NOTE — PATIENT INSTRUCTIONS
Matthew Kerr,    It was nice meeting with you today. Happy holidays and keep up the great work!    Plan:  Decrease Basaglar to 4 units daily  Decrease Metformin XR to 500 mg with your biggest meal  Whenyou start Mounjaro 5 mg:  Stop taking Basaglar  Reach out via [employer] or telephone if you are noticing higher blood sugars so we can adjust your insulin doses.  If you notice any abdominal pain, with or without nausea and/or vomiting, stop Mounjaro, and call provider/clinic for advice.    Follow-up:  With Elda on 1/26/2024  With Adali Boston PA-C on 2/27/2024    Contact information:   If you have concerns, please send a [employer] message or call the clinic at [phone].    For more urgent concerns that do not require 496, please call [phone] after hours/weekends and ask to speak with the Endocrinologist on call.

## 2023-12-28 NOTE — PROGRESS NOTES
Video-Visit Details    Type of service:  Video Visit  Patient consented to video visit  Video Start Time:  Joined the call at 12/28/2023, 8:58:50 am.  Video End Time:  Left the call at 12/28/2023, 9:32:27 am.  You were on the call for 33 minutes 36 seconds   Originating Location (pt. Location): Home  Distant Location (provider location):  [facility] DIABETES EDUCATION [place]   Platform used for Video Visit: [employer]    Diabetes Self-Management Education & Support    Usha Wong presents today for education related to Type 2 diabetes    Patient is being treated with:  oral agents, insulin, and Mounjaro (will start today)  She is accompanied by self    Year of diagnosis: 2021  Referring provider:  Adali Bsoton PA-C  Living Situation: has roommates  Employment: student    PATIENT CONCERNS RELATED TO DIABETES SELF MANAGEMENT: is continuing to experience GI side effects from metformin when taking two 500 mg pills a day, has taken three doses of Mounjaro 2.5 mg, denies any side effects from Mounjaro        Monitoring:  Patient glucose self monitoring as follows: continuously using a continuous glucose monitor (CGM), wearing Cary 3 sensor  BG results:              Taking Medications:  Diabetes Medication(s)       Biguanides       metFORMIN (GLUCOPHAGE XR) 500 MG 24 hr tablet Take 3 tablets (1,500 mg) by mouth daily (with dinner)       Insulin       insulin aspart (NOVOLOG PEN) 100 UNIT/ML pen Inject 1-3 Units Subcutaneous 3 times daily (before meals) Correction Scale - LOW INSULIN RESISTANCE DOSING   Do Not give Correction Insulin if Pre-Meal BG less than 140. For Pre-Meal  - 239 give 1 unit. For Pre-Meal  - 339 give 2 units. For Pre-Meal BG greater than or equal to 340 give 3 units. To be given with prandial insulin, and based on pre-meal blood glucose. Notify provider if glucose greater than or equal to 350 mg/dL after administration of correction dose. If given at mealtime,  administer within 30 minutes of start of meal.     insulin aspart (NOVOLOG PEN) 100 UNIT/ML pen Inject 1-3 Units Subcutaneous At Bedtime LOW INSULIN RESISTANCE DOSING  Do Not give Bedtime Correction Insulin if BG less than 200. For  - 299 give 1 unit. For  - 399 give 2 units For BG greater than or equal 400 give 3 units. Notify provider if glucose greater than or equal to 350 mg/dL after administration of correction dose. If given at mealtime, administer within 30 minutes of start of meal.     insulin aspart (NOVOLOG PEN) 100 UNIT/ML pen Dose = 1 units per 15 grams of carbohydate. If given at mealtime, administer within 30 minutes of start of meal.     insulin glargine (BASAGLAR KWIKPEN) 100 UNIT/ML pen Inject 8 Units Subcutaneous every morning       Incretin Mimetic Agents       tirzepatide (MOUNJARO) 2.5 MG/0.5ML pen Inject 2.5 mg Subcutaneous every 7 days     tirzepatide (MOUNJARO) 5 MG/0.5ML pen Inject 5 mg Subcutaneous every 7 days If tolerating 2.5 mg          No longer taking Novolog  Taking 9 units of Basaglar daily  Taking 2.5 mg of Mounjaro weekly       Problem Solving:  Had one episode of hypoglycemia when out with friends last month  Patient is at risk of hypoglycemia?: YES  Hospitalizations for hyper or hypoglycemia: Yes (Please explain): at diagnosis, 9/30 - 10/2    Reducing Risks:  Addressed at prior visit     Patient's most recent   Lab Results   Component Value Date    A1C 10.5 09/30/2023    A1C 5.6 06/13/2018      Patient's A1C goal: <7.0    Being Active:  She is a student  Has been going on more walks with her partner    Nutrition:     Was getting burn-out from counting carbs all the time, especially when going out to eat.   Trying to be more mindful of meals especially during the holidays.  Is eating smaller portions, trying to include protein, veggies.     Healthy Coping:  Has friends and family for support      EDUCATION and INSTRUCTION PROVIDED AT THIS VISIT:    T2DM seen for  follow-up after starting Mounjaro. She has taken 3 doses of Mounjaro 2.5 mg so far. She denies any side effects and has been trying to eat smaller portions.   Overall, Usha is doing well. She has been more mindful of her portions and has been more active. She is learning a lot from her CGM data and reports feeling a lot better since her blood glucoses have been in target range.  Cary 3 data reviewed, Time in Range is 100%, GMI is 5.6, average glucose is 97 and variability of 13%. Her blood glucose has improved significantly since her hospitalization at the end of September 2023. Numbers in target range throughout the day. Her glucoses have been in target range after meals.  She lowered dose to 9 units of Basaglar daily as some of her fasting glucoses were <90 and has stopped Novolog coverage at meals. She has not needed to use Novolog for corrections.  Usha is still experiencing GI side effects from Metformin so I advised her to decrease it to 500 mg daily with her biggest meal.   She has already picked up Mounjaro 5 mg pens.       Plan:  Decrease Basaglar to 4 units daily  Decrease Metformin XR to 500 mg with your biggest meal  When you start Mounjaro 5 mg:  Stop taking Basaglar  Reach out via MyChart or telephone if you are noticing higher blood sugars so we can adjust your insulin doses.  If you notice any abdominal pain, with or without nausea and/or vomiting, stop Mounjaro, and call provider/clinic for advice.    Follow-up:  With Elda on 1/26/2024  With Adali Boston PA-C on 2/27/2024      Time spent with patient at today's visit was 33 minutes.      Any diabetes medication dose changes were made via the CDE Protocol and Collaborative Practice Agreement with [facility].  A copy of this encounter was provided to patient's referring provider.

## 2024-01-11 ENCOUNTER — OFFICE VISIT (OUTPATIENT)
Dept: FAMILY MEDICINE | Facility: CLINIC | Age: 24
End: 2024-01-11
Payer: COMMERCIAL

## 2024-01-11 VITALS
OXYGEN SATURATION: 99 % | BODY MASS INDEX: 32.8 KG/M2 | HEIGHT: 67 IN | HEART RATE: 86 BPM | DIASTOLIC BLOOD PRESSURE: 82 MMHG | RESPIRATION RATE: 18 BRPM | SYSTOLIC BLOOD PRESSURE: 115 MMHG | WEIGHT: 209 LBS | TEMPERATURE: 98 F

## 2024-01-11 DIAGNOSIS — Z12.4 CERVICAL CANCER SCREENING: ICD-10-CM

## 2024-01-11 DIAGNOSIS — S06.0X0D CONCUSSION WITHOUT LOSS OF CONSCIOUSNESS, SUBSEQUENT ENCOUNTER: ICD-10-CM

## 2024-01-11 DIAGNOSIS — E11.9 DIABETES MELLITUS, NEW ONSET (H): Primary | ICD-10-CM

## 2024-01-11 DIAGNOSIS — Z23 NEED FOR VACCINATION: ICD-10-CM
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
## 2024-01-11 LAB — HBA1C MFR BLD: 4.9 % (ref 0–5.6)

## 2024-01-11 PROCEDURE — 36415 COLL VENOUS BLD VENIPUNCTURE: CPT | Performed by: PHYSICIAN ASSISTANT

## 2024-01-11 PROCEDURE — 90471 IMMUNIZATION ADMIN: CPT | Performed by: PHYSICIAN ASSISTANT

## 2024-01-11 PROCEDURE — 90715 TDAP VACCINE 7 YRS/> IM: CPT | Performed by: PHYSICIAN ASSISTANT

## 2024-01-11 PROCEDURE — 83036 HEMOGLOBIN GLYCOSYLATED A1C: CPT | Performed by: PHYSICIAN ASSISTANT

## 2024-01-11 PROCEDURE — 99215 OFFICE O/P EST HI 40 MIN: CPT | Mod: 25 | Performed by: PHYSICIAN ASSISTANT

## 2024-01-11 ASSESSMENT — PAIN SCALES - GENERAL: PAINLEVEL: NO PAIN (0)

## 2024-01-11 NOTE — RESULT ENCOUNTER NOTE
Usha! Wow. A1c down to 4.9% which is amazing. Ok to discontinue the Metformin. Sound like a plan? Keep up the amazing work.     Sagar
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
## 2024-01-11 NOTE — LETTER
January 11, 2024      Usha Wong  [address] [facility] [address]        To Whom It May Concern:    Usha Wong was seen in our clinic. She has a diagnosis of diabetes. Please allow her to have reasonable accommodations due to this such as leaving class because of hypoglycemia etc. Additionally, requires regular appointments.          Sincerely,      Sagar Spangler PA-C

## 2024-01-11 NOTE — LETTER
January 11, 2024      Usha Wong  [address] [facility] [address]        To Whom It May Concern:    Usha Wong was seen on 1/11/24. She has a diagnosis for diabetes. Please allow for reasonable accommodations due to this such as leaving class because of hypoglycemia etc. Additionally, requires regular appointments.         Sincerely,        Sagar Spangler PA-C

## 2024-01-26 ENCOUNTER — VIRTUAL VISIT (OUTPATIENT)
Dept: EDUCATION SERVICES | Facility: CLINIC | Age: 24
End: 2024-01-26
Payer: COMMERCIAL

## 2024-01-26 DIAGNOSIS — Z79.4 INSULIN-REQUIRING OR DEPENDENT TYPE II DIABETES MELLITUS (H): ICD-10-CM

## 2024-01-26 DIAGNOSIS — E11.9 INSULIN-REQUIRING OR DEPENDENT TYPE II DIABETES MELLITUS (H): ICD-10-CM

## 2024-01-26 DIAGNOSIS — E11.9 DIABETES MELLITUS, NEW ONSET (H): ICD-10-CM

## 2024-01-26 PROCEDURE — 99207 PR NO BILLABLE SERVICE THIS VISIT: CPT | Mod: 95 | Performed by: DIETITIAN, REGISTERED

## 2024-01-26 ASSESSMENT — PAIN SCALES - GENERAL: PAINLEVEL: NO PAIN (0)

## 2024-01-26 NOTE — PROGRESS NOTES
Diabetes Self-Management Education & Support    Usha Wong presents today for education related to Type 2 Diabetes    Patient is being treated with:  diet, exercise, and Mounjaro  She is accompanied by self    Year of diagnosis: 2023  Referring provider:  Adali Boston PA-C  Living Situation: with roommates  Employment: art student    PATIENT CONCERNS RELATED TO DIABETES SELF MANAGEMENT: no concerns. Pleased with A1C improvement to 4.9! No longer taking metformin and GI symptoms have improved      SUBJECTIVE / OBJECTIVE:  Diabetes type: Type 2  Disease course: Improving    Reports 22 lb weight loss since DM diagnosis. Now 200 lb    Diabetes Symptoms & Complications:  Diabetes Related Symptoms: Fatigue, Polydipsia (increased thirst)  Weight trend: Decreasing  Symptom course: Improving  Disease course: Improving         Monitoring:  Times checking blood sugar at home (number): Other (see Comments)  Please elaborate:: Glucose monitor  Times checking blood sugar at home (per): Day  Also wearing [employer] 3 CGM  BG results:        Was unable to get sensor from pharmacy for over a week  Taking Medications:  Diabetes Medication(s)       Incretin Mimetic Agents       tirzepatide (MOUNJARO) 5 MG/0.5ML pen Inject 5 mg Subcutaneous every 7 days          Current Treatments: Diet, Non-insulin Injectables  Stopped Metformin  Stopped Basaglar and Novolog    Problem Solving:  Patient carries a carbohydrate source: Yes    Hypoglycemia Symptoms  Hypoglycemia: Headaches, Mood changes, Sleepiness    Hypoglycemia Complications  Hypoglycemia Complications: None    Patient is at risk of hypoglycemia?: YES  Hospitalizations for hyper or hypoglycemia: Yes (Please explain): upon DM diagnosis in 10/2023    Reducing Risks:       Patient's most recent   Lab Results   Component Value Date    A1C 4.9 01/11/2024    A1C 10.5 09/30/2023    A1C 5.6 06/13/2018      Patient's A1C goal: <7.0    Being Active:  Has been going on more walks.  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 Eating those in moderation  Usha is listening to her body and stops eating when feeling full.  She notices that she feels dueñas faster now.          EDUCATION and INSTRUCTION PROVIDED AT THIS VISIT:    T2DM seen for Comprehensive Knowledge Assessment and Instruction and follow-up. Usha is doing well, she had her A1C checked again recently and it was 4.9! Metformin was stopped by her PCP since she was still experiencing GI side effects from it. She reports tolerating Mounjaro 5 mg dose. She is feeling dueñas faster and finds herself eating smaller portions now. Has lost 22 lb since DM diagnosis in October 2023. Usha says weight loss has been gradual. She has made significant diet and lifestyle changes.   Bottlenose reports reviewed. Usha stopped wearing sensor for over a week as she was unable to pick it up from the pharmacy. Time in [employer] is 99%. Overnight lows noted, Usha says she has been double-checking with a fingerstick and BG meter was  when sensor glucose was < 70. Advised that if this trend continues to call [facility] for a replacement sensor.   She is no longer taking Basaglar, Novolog, and Metformin. Maintaining glucose well on Mounjaro 5 mg weekly. I think it makes sense to maintain Mounjaro at current dose unless Usha desires a greater weight loss effect from Mounjaro.   Counseled her to listen to her body and focus on getting enough protein, complex carbs, and fiber especially now that she is feeling dueñas faster.  Reviewed healthy eating, it is okay to eat sweets in moderation. Discussed that adding activity after meals helps post-prandial glucoses.  Encouraged her to continue with  "changes she has made.     Patient-stated goal written and given to Usha Wong.  Verbalized and demonstrated understanding of instructions.     PLAN:  Continue Mounjaro 5 mg weekly  Continue wearing Cary 3 sensor or using BG meter to check glucose  Eat three balanced meals each day, consider using plate planning method, aiming for a variety of food groups including 1/4 plate starch/grains, 1/4 plate lean protein, and 1/2 plate non-starchy vegetables. Focus on \"high quality\" carbohydrates (whole grains, starchy vegetables, fruits, beans/legumes). Limit added sugar.  Activity helps to improve blood sugars. Try to incorporate 150 minutes per week (at least 10 minute at a time, and at least every other day)    See patient instructions  AVS printed and given to patient    FOLLOW-UP:    With Adali Boston on 2/27/2024  With Elda in 4/12/2024    Time spent with patient at today's visit was 26 minutes.      Any diabetes medication initiation or dose changes were made via the CDCES Standing Orders per the patient's referring provider. A copy of this encounter was shared with the provider.    "

## 2024-01-26 NOTE — PROGRESS NOTES
Virtual Visit Details    Type of service:  Video Visit   Start Time: 10:00 AM  End Time: 10:26 AM  Originating Location (pt. Location): Home    Distant Location (provider location):  On-site  Platform used for Video Visit: Food Sprout

## 2024-01-26 NOTE — PATIENT INSTRUCTIONS
"[employer] (replacements)  Phone: [phone]    PLAN:  Continue Mounjaro 5 mg weekly  Continue wearing Cary 3 sensor or using BG meter to check glucose  Eat three balanced meals each day, consider using plate planning method, aiming for a variety of food groups including 1/4 plate starch/grains, 1/4 plate lean protein, and 1/2 plate non-starchy vegetables. Focus on \"high quality\" carbohydrates (whole grains, starchy vegetables, fruits, beans/legumes). Limit added sugar.  Activity helps to improve blood sugars. Try to incorporate 150 minutes per week (at least 10 minute at a time, and at least every other day)      FOLLOW-UP:    With Adali Boston on 2/27/2024  With Elda in 4/12/2024    Contact information:   If you have concerns, please send a Zinio message or call the clinic at [phone].    For more urgent concerns that do not require 911, please call [phone] after hours/weekends and ask to speak with the Endocrinologist on call.           "

## 2024-01-26 NOTE — NURSING NOTE
Is the patient currently in the state of [place]? YES    Visit mode:VIDEO    If the visit is dropped, the patient can be reconnected by: VIDEO VISIT: Text to cell phone:   Telephone Information:   Mobile [phone]       Will anyone else be joining the visit? NO  (If patient encounters technical issues they should call [phone]    How would you like to obtain your AVS? MyChart    Are changes needed to the allergy or medication list? No    Reason for visit: RECHECK Shelby Kocher VVF

## 2024-02-15 ENCOUNTER — MYC MEDICAL ADVICE (OUTPATIENT)
Dept: FAMILY MEDICINE | Facility: CLINIC | Age: 24
End: 2024-02-15
Payer: COMMERCIAL

## 2024-02-16 ENCOUNTER — OFFICE VISIT (OUTPATIENT)
Dept: FAMILY MEDICINE | Facility: CLINIC | Age: 24
End: 2024-02-16
Payer: COMMERCIAL

## 2024-02-16 ENCOUNTER — NURSE TRIAGE (OUTPATIENT)
Dept: FAMILY MEDICINE | Facility: CLINIC | Age: 24
End: 2024-02-16

## 2024-02-16 ENCOUNTER — ANCILLARY PROCEDURE (OUTPATIENT)
Dept: GENERAL RADIOLOGY | Facility: CLINIC | Age: 24
End: 2024-02-16
Attending: PHYSICIAN ASSISTANT
Payer: COMMERCIAL

## 2024-02-16 VITALS
HEIGHT: 67 IN | WEIGHT: 199.7 LBS | OXYGEN SATURATION: 97 % | DIASTOLIC BLOOD PRESSURE: 80 MMHG | RESPIRATION RATE: 20 BRPM | HEART RATE: 119 BPM | BODY MASS INDEX: 31.34 KG/M2 | TEMPERATURE: 97.5 F | SYSTOLIC BLOOD PRESSURE: 117 MMHG

## 2024-02-16 DIAGNOSIS — R00.0 TACHYCARDIA: ICD-10-CM

## 2024-02-16 DIAGNOSIS — R07.9 CHEST PAIN, UNSPECIFIED TYPE: ICD-10-CM

## 2024-02-16 DIAGNOSIS — R04.0 EPISTAXIS: ICD-10-CM

## 2024-02-16 DIAGNOSIS — F41.9 ANXIETY: Primary | ICD-10-CM

## 2024-02-16 LAB
ANION GAP SERPL CALCULATED.3IONS-SCNC: 13 MMOL/L (ref 7–15)
BUN SERPL-MCNC: 8.5 MG/DL (ref 6–20)
CALCIUM SERPL-MCNC: 9.5 MG/DL (ref 8.6–10)
CHLORIDE SERPL-SCNC: 103 MMOL/L (ref 98–107)
CREAT SERPL-MCNC: 0.69 MG/DL (ref 0.51–0.95)
D DIMER PPP FEU-MCNC: <0.27 UG/ML FEU (ref 0–0.5)
DEPRECATED HCO3 PLAS-SCNC: 23 MMOL/L (ref 22–29)
EGFRCR SERPLBLD CKD-EPI 2021: >90 ML/MIN/1.73M2
ERYTHROCYTE [DISTWIDTH] IN BLOOD BY AUTOMATED COUNT: 12.5 % (ref 10–15)
GLUCOSE SERPL-MCNC: 79 MG/DL (ref 70–99)
HCG SERPL QL: NEGATIVE
HCT VFR BLD AUTO: 41.9 % (ref 35–47)
HGB BLD-MCNC: 14 G/DL (ref 11.7–15.7)
MCH RBC QN AUTO: 27.4 PG (ref 26.5–33)
MCHC RBC AUTO-ENTMCNC: 33.4 G/DL (ref 31.5–36.5)
MCV RBC AUTO: 82 FL (ref 78–100)
PLATELET # BLD AUTO: 161 10E3/UL (ref 150–450)
POTASSIUM SERPL-SCNC: 3.7 MMOL/L (ref 3.4–5.3)
RBC # BLD AUTO: 5.11 10E6/UL (ref 3.8–5.2)
SODIUM SERPL-SCNC: 139 MMOL/L (ref 135–145)
TROPONIN T SERPL HS-MCNC: <6 NG/L
TSH SERPL DL<=0.005 MIU/L-ACNC: 1.58 UIU/ML (ref 0.3–4.2)
WBC # BLD AUTO: 2.8 10E3/UL (ref 4–11)

## 2024-02-16 PROCEDURE — 85379 FIBRIN DEGRADATION QUANT: CPT | Performed by: PHYSICIAN ASSISTANT

## 2024-02-16 PROCEDURE — 71046 X-RAY EXAM CHEST 2 VIEWS: CPT | Mod: TC | Performed by: RADIOLOGY

## 2024-02-16 PROCEDURE — 85027 COMPLETE CBC AUTOMATED: CPT | Performed by: PHYSICIAN ASSISTANT

## 2024-02-16 PROCEDURE — 84443 ASSAY THYROID STIM HORMONE: CPT | Performed by: PHYSICIAN ASSISTANT

## 2024-02-16 PROCEDURE — 93000 ELECTROCARDIOGRAM COMPLETE: CPT | Performed by: PHYSICIAN ASSISTANT

## 2024-02-16 PROCEDURE — 36415 COLL VENOUS BLD VENIPUNCTURE: CPT | Performed by: PHYSICIAN ASSISTANT

## 2024-02-16 PROCEDURE — 99215 OFFICE O/P EST HI 40 MIN: CPT | Mod: 25 | Performed by: PHYSICIAN ASSISTANT

## 2024-02-16 PROCEDURE — 84703 CHORIONIC GONADOTROPIN ASSAY: CPT | Performed by: PHYSICIAN ASSISTANT

## 2024-02-16 PROCEDURE — 84484 ASSAY OF TROPONIN QUANT: CPT | Performed by: PHYSICIAN ASSISTANT

## 2024-02-16 PROCEDURE — 80048 BASIC METABOLIC PNL TOTAL CA: CPT | Performed by: PHYSICIAN ASSISTANT

## 2024-02-16 RX ORDER — ALBUTEROL SULFATE 90 UG/1
2 AEROSOL, METERED RESPIRATORY (INHALATION) EVERY 4 HOURS PRN
Qty: 18 G | Refills: 3 | Status: SHIPPED | OUTPATIENT
Start: 2024-02-16

## 2024-02-16 RX ORDER — VENLAFAXINE HYDROCHLORIDE 37.5 MG/1
37.5 CAPSULE, EXTENDED RELEASE ORAL DAILY
Qty: 60 CAPSULE | Refills: 0 | Status: SHIPPED | OUTPATIENT
Start: 2024-02-16 | End: 2024-03-11

## 2024-02-16 ASSESSMENT — PAIN SCALES - GENERAL: PAINLEVEL: MILD PAIN (2)

## 2024-02-16 NOTE — PATIENT INSTRUCTIONS
Chest x-ray, EKG and labs today    Start effexor, follow-up with [facility], you may need to increase the dose    Aquaphor in nostrils
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
## 2024-02-16 NOTE — TELEPHONE ENCOUNTER
Patient Contact    Attempt # 1    Was call answered?  Yes. Writer relayed PCP's message below, patient expressed verbal understanding.    Shauna Albarran RN  [facility]

## 2024-02-16 NOTE — TELEPHONE ENCOUNTER
Patient is still having chest pain now, but no SOB of breath right now    Onset: a few days ago  Location: pressure and pain in center of chest  Pattern: constant  Severity: 2/10 now but more intense pain last night with a migraine  Cardiac risk factors: denies  Pulmonary risk factors: denies  Cause: pt guesses stress possibly? Pt guesses may monjaro?  Other sx: denies current dizziness, nausea, vomiting, sweating, fever, difficulty breathing, cough     Nurse Triage SBAR    Is this a 2nd Level Triage? YES, LICENSED PRACTITIONER REVIEW IS REQUIRED    Protocol Recommended Disposition:   Go to ED/UCC Now (Or To Office With PCP Approval)    Recommendation:   Pt was advised to be seen now in ED/UC, patient states that she is at a PT appt at 9:30am today but can be seen afterwards in UC. Patient states that she was planning to go to UC after PT appointment today. [facility] has openings today, writer scheduled:     2/16/2024 1:00 PM (Arrive by 12:40 PM) Adela Little PA-C [facility]     PCP, please advise if patient should be seen sooner in ED/UC, or if patient can be seen in clinic at scheduled appt.    Routed to provider    Does the patient meet one of the following criteria for ADS visit consideration? 16+ years old, with an [facility] PCP     TIP  Providers, please consider if this condition is appropriate for management at one of our Acute and Diagnostic Services sites.     If patient is a good candidate, please use dotphrase <dot>triageresponse and select Refer to ADS to document.    Callback: [phone] ok to leave a detailed vm    Shauna Albarran RN  -[facility]      Reason for Disposition   Chest pain lasting longer than 5 minutes and occurred in last 3 days (72 hours) (Exception: Feels exactly the same as previously diagnosed heartburn and has accompanying sour taste in mouth.)    Additional Information   Negative: SEVERE difficulty breathing  (e.g., struggling for each breath, speaks in single words)   Negative: Difficult to awaken or acting confused (e.g., disoriented, slurred speech)   Negative: Shock suspected (e.g., cold/pale/clammy skin, too weak to stand, low BP, rapid pulse)   Negative: Passed out (i.e., lost consciousness, collapsed and was not responding)   Negative: Chest pain lasting longer than 5 minutes and ANY of the following:         Pain is crushing, pressure-like, or heavy         Over 44 years old          Over 30 years old and one cardiac risk factor (e.g diabetes, high blood pressure, high cholesterol, smoker, or family history of heart disease)         History of heart disease (e.g. angina, heart attack, heart failure, bypass surgery, takes nitroglycerin)   Negative: Heart beating < 50 beats per minute OR > 140 beats per minute   Negative: Visible sweat on face or sweat dripping down face   Negative: Sounds like a life-threatening emergency to the triager   Negative: Followed an injury to chest   Negative: SEVERE chest pain   Negative: Pain also in shoulder(s) or arm(s) or jaw   Negative: Difficulty breathing   Negative: Cocaine use within last 3 days   Negative: Major surgery in the past month   Negative: Hip or leg fracture (broken bone) in past month (or had cast on leg or ankle in past month)   Negative: Illness requiring prolonged bedrest in past month (e.g., immobilization, long hospital stay)   Negative: Long-distance travel in past month (e.g., car, bus, train, plane; with trip lasting 6 or more hours)   Negative: History of prior 'blood clot' in leg or lungs (i.e., deep vein thrombosis, pulmonary embolism)   Negative: History of inherited increased risk of blood clots (e.g., Factor 5 Leiden, Anti-thrombin 3, Protein C or Protein S deficiency, Prothrombin mutation)   Negative: Cancer treatment in the past two months (or has cancer now)   Negative: Heart beating irregularly or very rapidly    Protocols used: Chest  Pain-A-[place]
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
## 2024-02-16 NOTE — RESULT ENCOUNTER NOTE
Matthew Kerr,     Here are your recent results:  Your chest x-ray was normal  Your troponin (cardiac protein) and d-dimer (test to help rule out blood clots) were both normal  Your EKG showed faster than normal heart rate  Your blood counts were normal aside from your white blood cell count which was a little lower than normal.  I'd recommend rechecking this value when you see Jackson in March.    I added some thyroid labs onto the blood we elie today.  I'll let you know when I see those results.    Please let us know if you have any questions or concerns.    Regards,  Adela Mandel PA-C

## 2024-02-19 NOTE — RESULT ENCOUNTER NOTE
Matthew Lanier,     Here are the rest of your results which are normal:  Your bmp was normal which includes kidney function and electrolytes  Thyroid stimulating hormone was normal     Please let us know if you have any questions or concerns.    Regards,  Adela Mandel PA-C

## 2024-02-26 ENCOUNTER — TELEPHONE (OUTPATIENT)
Dept: ENDOCRINOLOGY | Facility: CLINIC | Age: 24
End: 2024-02-26
Payer: COMMERCIAL

## 2024-02-26 NOTE — TELEPHONE ENCOUNTER
Called patient and left voicemail. Patient has an appointment on 2/27/24. Need patient to upload their [place] device to site for provider to review prior to their appointment.    Scarlet Salas LPN 02/26/24 9:19 AM

## 2024-02-27 ENCOUNTER — VIRTUAL VISIT (OUTPATIENT)
Dept: ENDOCRINOLOGY | Facility: CLINIC | Age: 24
End: 2024-02-27
Payer: COMMERCIAL

## 2024-02-27 DIAGNOSIS — E11.9 TYPE 2 DIABETES MELLITUS WITHOUT COMPLICATION, WITHOUT LONG-TERM CURRENT USE OF INSULIN (H): Primary | ICD-10-CM

## 2024-02-27 PROCEDURE — 99215 OFFICE O/P EST HI 40 MIN: CPT | Mod: 95 | Performed by: PHYSICIAN ASSISTANT

## 2024-02-27 ASSESSMENT — PATIENT HEALTH QUESTIONNAIRE - PHQ9: SUM OF ALL RESPONSES TO PHQ QUESTIONS 1-9: 14

## 2024-02-27 NOTE — NURSING NOTE
Is the patient currently in the state of [place]? YES    Visit mode:VIDEO    If the visit is dropped, the patient can be reconnected by: VIDEO VISIT: Text to cell phone:   Telephone Information:   Mobile [phone]       Will anyone else be joining the visit? NO  (If patient encounters technical issues they should call [phone])    How would you like to obtain your AVS? MyChart    Are changes needed to the allergy or medication list? Pt stated no changes to allergies and Pt stated no med changes    Reason for visit: Follow Up    Scarlet Salas LPN LPN
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
## 2024-02-27 NOTE — PATIENT INSTRUCTIONS
You have earned this graduation by achieving diabetes related goals and stability.    It has been a pleasure serving you.  Please use the lessons learned in the diabetes clinic as a base on which to build a lifetime of better health and wellness.  You should continue to regularly follow up with your primary care provider for diabetes management.    Please schedule appointment with your primary care provider within the next 3 to 6 months of your graduation.   We have refilled all of your diabetes-related medication for the next year.    Future changes and refills should come from your primary care provider.     The diabetes care team remains available to you for future consultation should you and your doctor have new questions or concerns.      Dear Usha,    It has been yeimi working with you!  You have done excellent and I am sure that you will continue to.  A few things to keep in mind:  Get A1C at least twice yearly for now, in future may decrease to annually but if having symptoms of diabetes start checking BG and be seen.  Prior to conception, we currently recommend stopping Mounjaro or any similar drugs.  Have a preconception consult to bring blood glucose to pregnancy goals with diabetes educator or other provider.  If you suddenly become pregnant, see diabetes provider right away.    My best wishes,    Adali Boston PA-C, MPAS  [facility]  Diabetes, Endocrinology, and Metabolism  [phone] Appointments/Nurse  [phone] Fax

## 2024-03-09 DIAGNOSIS — F41.9 ANXIETY: ICD-10-CM

## 2024-03-11 RX ORDER — VENLAFAXINE HYDROCHLORIDE 37.5 MG/1
37.5 CAPSULE, EXTENDED RELEASE ORAL DAILY
Qty: 90 CAPSULE | Refills: 0 | Status: SHIPPED | OUTPATIENT
Start: 2024-03-11 | End: 2024-05-03

## 2024-03-15 ENCOUNTER — OFFICE VISIT (OUTPATIENT)
Dept: FAMILY MEDICINE | Facility: CLINIC | Age: 24
End: 2024-03-15
Payer: COMMERCIAL

## 2024-03-15 VITALS
HEIGHT: 66 IN | RESPIRATION RATE: 18 BRPM | TEMPERATURE: 98 F | BODY MASS INDEX: 30.37 KG/M2 | DIASTOLIC BLOOD PRESSURE: 75 MMHG | OXYGEN SATURATION: 97 % | WEIGHT: 189 LBS | HEART RATE: 85 BPM | SYSTOLIC BLOOD PRESSURE: 118 MMHG

## 2024-03-15 DIAGNOSIS — Z11.59 NEED FOR HEPATITIS C SCREENING TEST: ICD-10-CM

## 2024-03-15 DIAGNOSIS — Z00.00 ANNUAL PHYSICAL EXAM: Primary | ICD-10-CM

## 2024-03-15 DIAGNOSIS — B07.0 PLANTAR WARTS: ICD-10-CM

## 2024-03-15 DIAGNOSIS — Z11.3 SCREENING FOR STDS (SEXUALLY TRANSMITTED DISEASES): ICD-10-CM

## 2024-03-15 DIAGNOSIS — Z11.4 SCREENING FOR HIV (HUMAN IMMUNODEFICIENCY VIRUS): ICD-10-CM

## 2024-03-15 DIAGNOSIS — Z12.4 CERVICAL CANCER SCREENING: ICD-10-CM

## 2024-03-15 DIAGNOSIS — E11.9 DIABETES MELLITUS, NEW ONSET (H): ICD-10-CM
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
## 2024-03-15 DIAGNOSIS — H61.22 IMPACTED CERUMEN OF LEFT EAR: ICD-10-CM

## 2024-03-15 DIAGNOSIS — F41.9 ANXIETY: ICD-10-CM

## 2024-03-15 LAB
BASOPHILS # BLD AUTO: 0 10E3/UL (ref 0–0.2)
BASOPHILS NFR BLD AUTO: 0 %
EOSINOPHIL # BLD AUTO: 0 10E3/UL (ref 0–0.7)
EOSINOPHIL NFR BLD AUTO: 0 %
ERYTHROCYTE [DISTWIDTH] IN BLOOD BY AUTOMATED COUNT: 13.1 % (ref 10–15)
HCT VFR BLD AUTO: 42 % (ref 35–47)
HGB BLD-MCNC: 14.2 G/DL (ref 11.7–15.7)
IMM GRANULOCYTES # BLD: 0 10E3/UL
IMM GRANULOCYTES NFR BLD: 0 %
LYMPHOCYTES # BLD AUTO: 1.5 10E3/UL (ref 0.8–5.3)
LYMPHOCYTES NFR BLD AUTO: 28 %
MCH RBC QN AUTO: 27.5 PG (ref 26.5–33)
MCHC RBC AUTO-ENTMCNC: 33.8 G/DL (ref 31.5–36.5)
MCV RBC AUTO: 81 FL (ref 78–100)
MONOCYTES # BLD AUTO: 0.3 10E3/UL (ref 0–1.3)
MONOCYTES NFR BLD AUTO: 5 %
NEUTROPHILS # BLD AUTO: 3.4 10E3/UL (ref 1.6–8.3)
NEUTROPHILS NFR BLD AUTO: 66 %
PLATELET # BLD AUTO: 198 10E3/UL (ref 150–450)
RBC # BLD AUTO: 5.17 10E6/UL (ref 3.8–5.2)
WBC # BLD AUTO: 5.2 10E3/UL (ref 4–11)

## 2024-03-15 PROCEDURE — 36415 COLL VENOUS BLD VENIPUNCTURE: CPT | Performed by: PHYSICIAN ASSISTANT

## 2024-03-15 PROCEDURE — 69209 REMOVE IMPACTED EAR WAX UNI: CPT | Performed by: PHYSICIAN ASSISTANT

## 2024-03-15 PROCEDURE — 99214 OFFICE O/P EST MOD 30 MIN: CPT | Mod: 25 | Performed by: PHYSICIAN ASSISTANT

## 2024-03-15 PROCEDURE — 99395 PREV VISIT EST AGE 18-39: CPT | Mod: 25 | Performed by: PHYSICIAN ASSISTANT

## 2024-03-15 PROCEDURE — 17110 DESTRUCTION B9 LES UP TO 14: CPT | Performed by: PHYSICIAN ASSISTANT

## 2024-03-15 PROCEDURE — 87491 CHLMYD TRACH DNA AMP PROBE: CPT | Performed by: PHYSICIAN ASSISTANT

## 2024-03-15 PROCEDURE — 86803 HEPATITIS C AB TEST: CPT | Performed by: PHYSICIAN ASSISTANT

## 2024-03-15 PROCEDURE — 87389 HIV-1 AG W/HIV-1&-2 AB AG IA: CPT | Performed by: PHYSICIAN ASSISTANT

## 2024-03-15 PROCEDURE — 87591 N.GONORRHOEAE DNA AMP PROB: CPT | Performed by: PHYSICIAN ASSISTANT

## 2024-03-15 PROCEDURE — 80053 COMPREHEN METABOLIC PANEL: CPT | Performed by: PHYSICIAN ASSISTANT

## 2024-03-15 PROCEDURE — 85025 COMPLETE CBC W/AUTO DIFF WBC: CPT | Performed by: PHYSICIAN ASSISTANT

## 2024-03-15 RX ORDER — ESCITALOPRAM OXALATE 10 MG/1
10 TABLET ORAL DAILY
Qty: 30 TABLET | Refills: 2 | Status: SHIPPED | OUTPATIENT
Start: 2024-03-15 | End: 2024-06-10

## 2024-03-15 SDOH — HEALTH STABILITY: PHYSICAL HEALTH: ON AVERAGE, HOW MANY DAYS PER WEEK DO YOU ENGAGE IN MODERATE TO STRENUOUS EXERCISE (LIKE A BRISK WALK)?: 4 DAYS

## 2024-03-15 ASSESSMENT — PAIN SCALES - GENERAL: PAINLEVEL: NO PAIN (0)

## 2024-03-15 ASSESSMENT — SOCIAL DETERMINANTS OF HEALTH (SDOH): HOW OFTEN DO YOU GET TOGETHER WITH FRIENDS OR RELATIVES?: MORE THAN THREE TIMES A WEEK

## 2024-03-15 NOTE — NURSING NOTE
Patient identified using two patient identifiers.  Ear exam showing wax occlusion completed by provider.  Solution: warm water was placed in the left ear(s) via irrigation tool: elephant ear. Pt tolerated well.  Kanchan Cummings, CMA

## 2024-03-15 NOTE — PATIENT INSTRUCTIONS
-Direct switch from Effexor to Lexapro 10 mg daily.  -Close follow-up with therapist.   -Note for PT  -OBGYN for PAP
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
## 2024-03-15 NOTE — LETTER
March 15, 2024      Usha Wong  [address] [facility] [address]        To Whom It May Concern:    Usha Wong was seen in our clinic on 3/15/24. Please allow her to continue physical therapy for cervical neck pain.       Sincerely,        Sagar Spangler PA-C

## 2024-03-16 LAB
ALBUMIN SERPL BCG-MCNC: 5.1 G/DL (ref 3.5–5.2)
ALP SERPL-CCNC: 51 U/L (ref 40–150)
ALT SERPL W P-5'-P-CCNC: 20 U/L (ref 0–50)
ANION GAP SERPL CALCULATED.3IONS-SCNC: 13 MMOL/L (ref 7–15)
AST SERPL W P-5'-P-CCNC: 16 U/L (ref 0–45)
BILIRUB SERPL-MCNC: 0.7 MG/DL
BUN SERPL-MCNC: 10.8 MG/DL (ref 6–20)
C TRACH DNA SPEC QL PROBE+SIG AMP: NEGATIVE
CALCIUM SERPL-MCNC: 9.9 MG/DL (ref 8.6–10)
CHLORIDE SERPL-SCNC: 103 MMOL/L (ref 98–107)
CREAT SERPL-MCNC: 0.68 MG/DL (ref 0.51–0.95)
DEPRECATED HCO3 PLAS-SCNC: 23 MMOL/L (ref 22–29)
EGFRCR SERPLBLD CKD-EPI 2021: >90 ML/MIN/1.73M2
GLUCOSE SERPL-MCNC: 86 MG/DL (ref 70–99)
HCV AB SERPL QL IA: NONREACTIVE
HIV 1+2 AB+HIV1 P24 AG SERPL QL IA: NONREACTIVE
N GONORRHOEA DNA SPEC QL NAA+PROBE: NEGATIVE
POTASSIUM SERPL-SCNC: 4 MMOL/L (ref 3.4–5.3)
PROT SERPL-MCNC: 7.7 G/DL (ref 6.4–8.3)
SODIUM SERPL-SCNC: 139 MMOL/L (ref 135–145)

## 2024-03-18 NOTE — RESULT ENCOUNTER NOTE
Russell Torres seeing you last week for your annual physical!     -Stable blood counts. No signs of anemia.     -Your comprehensive metabolic panel which includes tests of liver function (ALT, AST, total bilirubin, alkaline phosphatase), kidney function (creatinine, BUN), electrolytes (sodium, potassium, calcium), and blood sugar (glucose) returned stable for you.    -Negative STI screening    Let me know if you have any questions or concerns,     Sagar Spangler PA-C  [facility]

## 2024-04-12 ENCOUNTER — VIRTUAL VISIT (OUTPATIENT)
Dept: EDUCATION SERVICES | Facility: CLINIC | Age: 24
End: 2024-04-12
Payer: COMMERCIAL

## 2024-04-12 VITALS — BODY MASS INDEX: 29.28 KG/M2 | WEIGHT: 182 LBS

## 2024-04-12 DIAGNOSIS — Z79.4 INSULIN-REQUIRING OR DEPENDENT TYPE II DIABETES MELLITUS (H): ICD-10-CM

## 2024-04-12 DIAGNOSIS — E11.9 INSULIN-REQUIRING OR DEPENDENT TYPE II DIABETES MELLITUS (H): ICD-10-CM

## 2024-04-12 DIAGNOSIS — E11.9 DIABETES MELLITUS, NEW ONSET (H): ICD-10-CM

## 2024-04-12 PROCEDURE — G0108 DIAB MANAGE TRN  PER INDIV: HCPCS | Mod: 95 | Performed by: DIETITIAN, REGISTERED

## 2024-04-12 RX ORDER — BLOOD-GLUCOSE SENSOR
1 EACH MISCELLANEOUS
Qty: 2 EACH | Refills: 11 | Status: SHIPPED | OUTPATIENT
Start: 2024-04-12

## 2024-04-12 NOTE — PATIENT INSTRUCTIONS
Matthew Kerr,    It was nice meeting with you today. I'm so proud of you! You're doing an amazing job managing your diabetes.    Here is a summary of our visit and plan:    PLAN:  Continue Mounjaro 5 mg weekly  Use Cary 3 and/or glucose meter as learning tool when needed.  Include fiber (legumes, whole grains, vegetables, fruits) as well as healthy fats and proteins (lean meats, fish, nuts, avocados, etc) in diet    See patient instructions  AVS printed and given to patient    FOLLOW-UP:    With PCP  With Diabetes Education as needed    Contact information:     To schedule a Diabetes Education appointment, call [phone] (you can ask your PCP for another diabetes education referral if your current referral has )    If you have concerns, please send a Freedom[employer] message or call the clinic at [phone].      Please let us know if you are having low blood sugars less than 70 or over 250 mg/dL.  Do not wait until your next appointment if this is happening.            Elda MOMIN  Diabetes Educator  [address] [address]  Phone: [phone]  Email: [email or web address]

## 2024-04-12 NOTE — NURSING NOTE
Is the patient currently in the state of [place]? YES    Visit mode:VIDEO    If the visit is dropped, the patient can be reconnected by: VIDEO VISIT: Text to cell phone:   Telephone Information:   Mobile [phone]       Will anyone else be joining the visit? NO  (If patient encounters technical issues they should call [phone]    How would you like to obtain your AVS? MyChart    Are changes needed to the allergy or medication list? Yes no longer taking venlafaxine    Are refills needed on medications prescribed by this physician?     Reason for visit: RECHECK and Video Visit    Regina JEFF
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
## 2024-05-03 ENCOUNTER — VIRTUAL VISIT (OUTPATIENT)
Dept: FAMILY MEDICINE | Facility: CLINIC | Age: 24
End: 2024-05-03
Payer: COMMERCIAL

## 2024-05-03 DIAGNOSIS — F41.9 ANXIETY: Primary | ICD-10-CM

## 2024-05-03 DIAGNOSIS — E11.9 TYPE 2 DIABETES MELLITUS WITHOUT COMPLICATION, WITHOUT LONG-TERM CURRENT USE OF INSULIN (H): ICD-10-CM

## 2024-05-03 PROCEDURE — 99213 OFFICE O/P EST LOW 20 MIN: CPT | Mod: 95 | Performed by: PHYSICIAN ASSISTANT

## 2024-05-03 NOTE — PROGRESS NOTES
"Usha is a 23 year old who is being evaluated via a billable video visit.    How would you like to obtain your AVS? MyChart  If the video visit is dropped, the invitation should be resent by: Text to cell phone: [phone]  Will anyone else be joining your video visit? No      Assessment & Plan     Anxiety  Continue Lexapro 10 mg.  This has been agreed addition for her.  Follow-up as needed.    Type II diabetes mellitus (H)  Increase Mounjaro from 5 mg to 7.5 mg.  She will keep posted if this causes side effects and wants to go back to the 5 mg dose.  Continue healthy diet and exercise.  Follows up with diabetes education as needed.  Has amazing control over her diabetes!   - tirzepatide (MOUNJARO) 7.5 MG/0.5ML pen  Dispense: 2 mL; Refill: 5    20 minutes spent by me on the date of the encounter doing chart review, review of test results, interpretation of tests, patient visit, and documentation       BMI  Estimated body mass index is 29.28 kg/m  as calculated from the following:    Height as of 3/15/24: 1.679 m (5' 6.1\").    Weight as of 4/12/24: 82.6 kg (182 lb).   Weight management plan: Discussed healthy diet and exercise guidelines      Subjective   Usha is a 23 year old, presenting for the following health issues:  Follow Up    Here today for follow-up.  Overall doing very well since her last visit.  We switched her Effexor to Lexapro 10 mg.  Significant improvement in mood as well as ability to control her anxiety.  No panic attacks in the past 3 to 4 weeks.  Staying active with walking approximately an hour a day which is awesome.  Continues to work with therapist.  Got out of stressful relationship which is also been helpful for her mood.        5/3/2024     4:24 PM   Additional Questions   Roomed by Fady   Accompanied by Not applicable, by themselves             Review of Systems  Constitutional, neuro, ENT, endocrine, pulmonary, cardiac, gastrointestinal, genitourinary, musculoskeletal, " integument and psychiatric systems are negative, except as otherwise noted.      Objective           Vitals:  No vitals were obtained today due to virtual visit.    Physical Exam   GENERAL: alert and no distress  EYES: Eyes grossly normal to inspection.  No discharge or erythema, or obvious scleral/conjunctival abnormalities.  RESP: No audible wheeze, cough, or visible cyanosis.    SKIN: Visible skin clear. No significant rash, abnormal pigmentation or lesions.  NEURO: Cranial nerves grossly intact.  Mentation and speech appropriate for age.  PSYCH: Appropriate affect, tone, and pace of words        Video-Visit Details    Type of service:  Video Visit   Originating Location (pt. Location): Home    Distant Location (provider location):  On-site  Platform used for Video Visit: Maynor    The likelihood of other entities in the differential is insufficient to justify any further testing for them at this time. This was explained to the patient. The patient was advised that persistent or worsening symptoms would require further evaluation. Patient advised to call the office and if unable to reach to go to the emergency room if they develop any new or worsening symptoms. Expressed understanding and agreement with above stated plan.       Signed Electronically by: Sagar Spangler PA-C

## 2024-05-06 ENCOUNTER — TELEPHONE (OUTPATIENT)
Dept: FAMILY MEDICINE | Facility: CLINIC | Age: 24
End: 2024-05-06
Payer: COMMERCIAL

## 2024-05-06 DIAGNOSIS — E11.9 TYPE 2 DIABETES MELLITUS WITHOUT COMPLICATION, WITHOUT LONG-TERM CURRENT USE OF INSULIN (H): ICD-10-CM

## 2024-05-06 NOTE — TELEPHONE ENCOUNTER
Prior Authorization Retail Medication Request    Medication/Dose: Mounjaro 7.5 mg/0.5mL  Diagnosis and ICD code (if different than what is on RX):  E11.9  New/renewal/insurance change PA/secondary ins. PA:  Previously Tried and Failed:  None  Rationale:  Patient stable on medication and has had good control of blood sugars with medication    Insurance   Primary: [employer]  Insurance ID:  [id number]    Secondary (if applicable):[employer]  Insurance ID:  [id number]    Pharmacy Information (if different than what is on RX)  Name:  [id number]  Phone:  [phone]  Fax:[phone]

## 2024-05-09 ENCOUNTER — MYC REFILL (OUTPATIENT)
Dept: FAMILY MEDICINE | Facility: CLINIC | Age: 24
End: 2024-05-09
Payer: COMMERCIAL

## 2024-05-09 DIAGNOSIS — E11.9 TYPE 2 DIABETES MELLITUS WITHOUT COMPLICATION, WITHOUT LONG-TERM CURRENT USE OF INSULIN (H): ICD-10-CM

## 2024-05-15 ENCOUNTER — TELEPHONE (OUTPATIENT)
Dept: FAMILY MEDICINE | Facility: CLINIC | Age: 24
End: 2024-05-15
Payer: COMMERCIAL

## 2024-05-15 NOTE — TELEPHONE ENCOUNTER
Triage informed pt Mounjaro 7.5 mg 0.5 ml pen was approved 05/03/2024 with refills. Pt states she received a text letting her know PA is needed and advised her to contact PCP.     Per chart review, PA request was received 05/06/2024 and was routed to PA team.     Triage called [facility] pharmacy and to verify Rx was received. Pharmacist confirmed Rx was received and ran prescription. PA is not needed but Mounjauro is out of stock. Pharmacist could not say how soon they will get medication. Triage informed pt on message above. Advised pt to call other pharmacist to find if they have Mounjauro in stock and request transfer or call clinic back to reorder medication. Pt verbalized agreement with plan.

## 2024-05-15 NOTE — TELEPHONE ENCOUNTER
Medication Question or Refill    Contacts         Type Contact Phone/Fax    05/15/2024 12:06 PM CDT Phone (Incoming) Usha Wong (Self) [phone] (H)            What medication are you calling about (include dose and sig)?: Mounjaro injectable    Preferred Pharmacy:    [facility]/pharmacy #[id number] - [place], [place] - [address] [facility]  [address] [place] [address]  Phone: [phone] Fax: [phone]      Controlled Substance Agreement on file:   CSA -- Patient Level:    CSA: None found at the patient level.       Who prescribed the medication?: PCP    Do you need a refill? Yes  Pharmacy will not until PCP responds to their request.  Authorization request was denied.  Please call Pt.    When did you use the medication last? 04/25/24  Pt has missed almost 3 weeks of injections    Patient offered an appointment? No  If she needs an appt, please call her.    Do you have any questions or concerns?  Yes: Is she ok to have missed these injections?      Could we send this information to you in [employer] or would you prefer to receive a phone call?:   Patient would prefer a phone call   Okay to leave a detailed message?: Yes at Home number on file [phone] (home)

## 2024-05-16 ENCOUNTER — VIRTUAL VISIT (OUTPATIENT)
Dept: FAMILY MEDICINE | Facility: CLINIC | Age: 24
End: 2024-05-16
Payer: COMMERCIAL

## 2024-05-16 DIAGNOSIS — E66.812 CLASS 2 OBESITY WITH BODY MASS INDEX (BMI) OF 35.0 TO 35.9 IN ADULT, UNSPECIFIED OBESITY TYPE, UNSPECIFIED WHETHER SERIOUS COMORBIDITY PRESENT: ICD-10-CM

## 2024-05-16 DIAGNOSIS — E11.9 TYPE 2 DIABETES MELLITUS WITHOUT COMPLICATION, WITHOUT LONG-TERM CURRENT USE OF INSULIN (H): Primary | ICD-10-CM

## 2024-05-16 PROCEDURE — 99442 PR PHYSICIAN TELEPHONE EVALUATION 11-20 MIN: CPT | Mod: 93 | Performed by: PHYSICIAN ASSISTANT

## 2024-05-16 NOTE — PROGRESS NOTES
Usha is a 23 year old who is being evaluated via a billable video visit.    What phone number would you like to be contacted at? [phone]   How would you like to obtain your AVS? MyChart      Assessment & Plan     Type 2 diabetes mellitus without complication, without long-term current use of insulin (H)  Class 2 obesity with body mass index (BMI) of 35.0 to 35.9 in adult, unspecified obesity type, unspecified whether serious comorbidity present  Priority to get on some type of GLP-1.  Transition to Ozempic 1 mg and after 1 month can graduate to 2 mg dosage once weekly.  This should be a comparable dose to the Mounjaro she was started at.  Will send to [place] pharmacy as requested by patient.  Reviewed side effects and safety.  Comfortable with plan.  She will reach out with any issues.  - Semaglutide, 1 MG/DOSE, (OZEMPIC) 4 MG/3ML pen  Dispense: 3 mL; Refill: 1  - Semaglutide, 2 MG/DOSE, (OZEMPIC) 8 MG/3ML pen  Dispense: 9 mL; Refill: 3    Subjective   Usha is a 23 year old, presenting for the following health issues:  Follow Up (Mounjaro )    Follow-up virtual visit from 5/3/2024.  At that time increase Mounjaro to 7.5 mg.  Shortage of Mounjaro with the 7.5 as well as the 5 mg dose.  Called multiple pharmacies and was made aware of national shortage.  Has noted over the past 2 to 3 weeks without the medication her blood sugars have been running higher.  Noticed post prandial at lunchtime this week blood sugar in the 190s.  Feeling hungrier.  Wishes to switch to alternative.    Review of Systems  Constitutional, neuro, ENT, endocrine, pulmonary, cardiac, gastrointestinal, genitourinary, musculoskeletal, integument and psychiatric systems are negative, except as otherwise noted.      Objective    Vitals - Patient Reported  Pain Score: No Pain (0)      Vitals:  No vitals were obtained today due to virtual visit.    Physical Exam   GENERAL: alert and no distress  EYES: Eyes grossly normal to inspection.  No  discharge or erythema, or obvious scleral/conjunctival abnormalities.  RESP: No audible wheeze, cough, or visible cyanosis.    SKIN: Visible skin clear. No significant rash, abnormal pigmentation or lesions.  NEURO: Cranial nerves grossly intact.  Mentation and speech appropriate for age.  PSYCH: Appropriate affect, tone, and pace of words        Video-Visit Details    Type of service:  Video Visit   Originating Location (pt. Location): Home    Distant Location (provider location):  On-site  Platform used for Video Visit: Doximity    The likelihood of other entities in the differential is insufficient to justify any further testing for them at this time. This was explained to the patient. The patient was advised that persistent or worsening symptoms would require further evaluation. Patient advised to call the office and if unable to reach to go to the emergency room if they develop any new or worsening symptoms. Expressed understanding and agreement with above stated plan.     Signed Electronically by: Sagar Spangler PA-C

## 2024-05-17 NOTE — TELEPHONE ENCOUNTER
Retail Pharmacy Prior Authorization Team   Phone: [phone]    Prior Authorization Approval    Medication: MOUNJARO 7.5 MG/0.5ML SC SOPN  Authorization Effective Date: 4/17/2024  Authorization Expiration Date: 5/17/2025  Insurance Company: [employer]-Specialty [employer]'s - Phone [phone] Fax [phone]  Which Pharmacy is filling the prescription: [id number] - [place], [place] - [address] [place] [address]  Pharmacy Notified: YES  Patient Notified: YES **Instructed pharmacy to notify patient when script is ready to /ship.**

## 2024-06-10 DIAGNOSIS — F41.9 ANXIETY: ICD-10-CM

## 2024-06-11 RX ORDER — ESCITALOPRAM OXALATE 10 MG/1
10 TABLET ORAL DAILY
Qty: 90 TABLET | Refills: 2 | Status: SHIPPED | OUTPATIENT
Start: 2024-06-11

## 2024-08-13 ENCOUNTER — TRANSFERRED RECORDS (OUTPATIENT)
Dept: HEALTH INFORMATION MANAGEMENT | Facility: CLINIC | Age: 24
End: 2024-08-13
Payer: COMMERCIAL

## 2024-10-08 DIAGNOSIS — E11.9 TYPE 2 DIABETES MELLITUS WITHOUT COMPLICATION, WITHOUT LONG-TERM CURRENT USE OF INSULIN (H): Primary | ICD-10-CM

## 2024-10-08 RX ORDER — HYDROCHLOROTHIAZIDE 12.5 MG/1
CAPSULE ORAL
Qty: 6 EACH | Refills: 1 | Status: SHIPPED | OUTPATIENT
Start: 2024-10-08

## 2024-10-29 ASSESSMENT — ANXIETY QUESTIONNAIRES
5. BEING SO RESTLESS THAT IT IS HARD TO SIT STILL: SEVERAL DAYS
2. NOT BEING ABLE TO STOP OR CONTROL WORRYING: SEVERAL DAYS
GAD7 TOTAL SCORE: 9
6. BECOMING EASILY ANNOYED OR IRRITABLE: SEVERAL DAYS
7. FEELING AFRAID AS IF SOMETHING AWFUL MIGHT HAPPEN: SEVERAL DAYS
3. WORRYING TOO MUCH ABOUT DIFFERENT THINGS: SEVERAL DAYS
8. IF YOU CHECKED OFF ANY PROBLEMS, HOW DIFFICULT HAVE THESE MADE IT FOR YOU TO DO YOUR WORK, TAKE CARE OF THINGS AT HOME, OR GET ALONG WITH OTHER PEOPLE?: SOMEWHAT DIFFICULT
GAD7 TOTAL SCORE: 9
4. TROUBLE RELAXING: NEARLY EVERY DAY
1. FEELING NERVOUS, ANXIOUS, OR ON EDGE: SEVERAL DAYS
GAD7 TOTAL SCORE: 9
7. FEELING AFRAID AS IF SOMETHING AWFUL MIGHT HAPPEN: SEVERAL DAYS
IF YOU CHECKED OFF ANY PROBLEMS ON THIS QUESTIONNAIRE, HOW DIFFICULT HAVE THESE PROBLEMS MADE IT FOR YOU TO DO YOUR WORK, TAKE CARE OF THINGS AT HOME, OR GET ALONG WITH OTHER PEOPLE: SOMEWHAT DIFFICULT

## 2024-10-31 ENCOUNTER — OFFICE VISIT (OUTPATIENT)
Dept: FAMILY MEDICINE | Facility: CLINIC | Age: 24
End: 2024-10-31
Payer: COMMERCIAL

## 2024-10-31 VITALS
HEIGHT: 66 IN | RESPIRATION RATE: 16 BRPM | BODY MASS INDEX: 27.97 KG/M2 | TEMPERATURE: 98.1 F | DIASTOLIC BLOOD PRESSURE: 80 MMHG | WEIGHT: 174 LBS | OXYGEN SATURATION: 99 % | SYSTOLIC BLOOD PRESSURE: 118 MMHG | HEART RATE: 93 BPM

## 2024-10-31 DIAGNOSIS — Z23 HIGH PRIORITY FOR 2019-NCOV VACCINE: ICD-10-CM

## 2024-10-31 DIAGNOSIS — Z23 NEED FOR PROPHYLACTIC VACCINATION AND INOCULATION AGAINST INFLUENZA: ICD-10-CM

## 2024-10-31 DIAGNOSIS — F41.9 ANXIETY: ICD-10-CM

## 2024-10-31 DIAGNOSIS — E11.9 TYPE 2 DIABETES MELLITUS WITHOUT COMPLICATION, WITHOUT LONG-TERM CURRENT USE OF INSULIN (H): Primary | ICD-10-CM

## 2024-10-31 DIAGNOSIS — R53.83 OTHER FATIGUE: ICD-10-CM

## 2024-10-31 DIAGNOSIS — B07.0 PLANTAR WARTS: ICD-10-CM

## 2024-10-31 LAB
ALBUMIN SERPL BCG-MCNC: 4.5 G/DL (ref 3.5–5.2)
ALP SERPL-CCNC: 46 U/L (ref 40–150)
ALT SERPL W P-5'-P-CCNC: 16 U/L (ref 0–50)
ANION GAP SERPL CALCULATED.3IONS-SCNC: 9 MMOL/L (ref 7–15)
AST SERPL W P-5'-P-CCNC: 17 U/L (ref 0–45)
BASOPHILS # BLD AUTO: 0 10E3/UL (ref 0–0.2)
BASOPHILS NFR BLD AUTO: 0 %
BILIRUB SERPL-MCNC: 0.7 MG/DL
BUN SERPL-MCNC: 11.3 MG/DL (ref 6–20)
CALCIUM SERPL-MCNC: 9.4 MG/DL (ref 8.8–10.4)
CHLORIDE SERPL-SCNC: 104 MMOL/L (ref 98–107)
CREAT SERPL-MCNC: 0.7 MG/DL (ref 0.51–0.95)
EGFRCR SERPLBLD CKD-EPI 2021: >90 ML/MIN/1.73M2
EOSINOPHIL # BLD AUTO: 0.1 10E3/UL (ref 0–0.7)
EOSINOPHIL NFR BLD AUTO: 2 %
ERYTHROCYTE [DISTWIDTH] IN BLOOD BY AUTOMATED COUNT: 12 % (ref 10–15)
EST. AVERAGE GLUCOSE BLD GHB EST-MCNC: 85 MG/DL
FERRITIN SERPL-MCNC: 33 NG/ML (ref 6–175)
GLUCOSE SERPL-MCNC: 72 MG/DL (ref 70–99)
HBA1C MFR BLD: 4.6 % (ref 0–5.6)
HCO3 SERPL-SCNC: 24 MMOL/L (ref 22–29)
HCT VFR BLD AUTO: 41.6 % (ref 35–47)
HGB BLD-MCNC: 13.7 G/DL (ref 11.7–15.7)
IMM GRANULOCYTES # BLD: 0 10E3/UL
IMM GRANULOCYTES NFR BLD: 0 %
IRON BINDING CAPACITY (ROCHE): 295 UG/DL (ref 240–430)
IRON SATN MFR SERPL: 27 % (ref 15–46)
IRON SERPL-MCNC: 80 UG/DL (ref 37–145)
LYMPHOCYTES # BLD AUTO: 2 10E3/UL (ref 0.8–5.3)
LYMPHOCYTES NFR BLD AUTO: 35 %
MCH RBC QN AUTO: 28.2 PG (ref 26.5–33)
MCHC RBC AUTO-ENTMCNC: 32.9 G/DL (ref 31.5–36.5)
MCV RBC AUTO: 86 FL (ref 78–100)
MONOCYTES # BLD AUTO: 0.3 10E3/UL (ref 0–1.3)
MONOCYTES NFR BLD AUTO: 6 %
NEUTROPHILS # BLD AUTO: 3.2 10E3/UL (ref 1.6–8.3)
NEUTROPHILS NFR BLD AUTO: 57 %
PLATELET # BLD AUTO: 165 10E3/UL (ref 150–450)
POTASSIUM SERPL-SCNC: 3.8 MMOL/L (ref 3.4–5.3)
PROT SERPL-MCNC: 7 G/DL (ref 6.4–8.3)
RBC # BLD AUTO: 4.86 10E6/UL (ref 3.8–5.2)
SODIUM SERPL-SCNC: 137 MMOL/L (ref 135–145)
VIT B12 SERPL-MCNC: 477 PG/ML (ref 232–1245)
WBC # BLD AUTO: 5.6 10E3/UL (ref 4–11)

## 2024-10-31 PROCEDURE — 99214 OFFICE O/P EST MOD 30 MIN: CPT | Mod: 25 | Performed by: PHYSICIAN ASSISTANT

## 2024-10-31 PROCEDURE — 83036 HEMOGLOBIN GLYCOSYLATED A1C: CPT | Performed by: PHYSICIAN ASSISTANT

## 2024-10-31 PROCEDURE — 82728 ASSAY OF FERRITIN: CPT | Performed by: PHYSICIAN ASSISTANT

## 2024-10-31 PROCEDURE — 36415 COLL VENOUS BLD VENIPUNCTURE: CPT | Performed by: PHYSICIAN ASSISTANT

## 2024-10-31 PROCEDURE — 80053 COMPREHEN METABOLIC PANEL: CPT | Performed by: PHYSICIAN ASSISTANT

## 2024-10-31 PROCEDURE — 82607 VITAMIN B-12: CPT | Performed by: PHYSICIAN ASSISTANT

## 2024-10-31 PROCEDURE — 90480 ADMN SARSCOV2 VAC 1/ONLY CMP: CPT | Performed by: PHYSICIAN ASSISTANT

## 2024-10-31 PROCEDURE — 83550 IRON BINDING TEST: CPT | Performed by: PHYSICIAN ASSISTANT

## 2024-10-31 PROCEDURE — 90656 IIV3 VACC NO PRSV 0.5 ML IM: CPT | Performed by: PHYSICIAN ASSISTANT

## 2024-10-31 PROCEDURE — 91320 SARSCV2 VAC 30MCG TRS-SUC IM: CPT | Performed by: PHYSICIAN ASSISTANT

## 2024-10-31 PROCEDURE — 96127 BRIEF EMOTIONAL/BEHAV ASSMT: CPT | Performed by: PHYSICIAN ASSISTANT

## 2024-10-31 PROCEDURE — 85025 COMPLETE CBC W/AUTO DIFF WBC: CPT | Performed by: PHYSICIAN ASSISTANT

## 2024-10-31 PROCEDURE — 90471 IMMUNIZATION ADMIN: CPT | Performed by: PHYSICIAN ASSISTANT

## 2024-10-31 PROCEDURE — 82043 UR ALBUMIN QUANTITATIVE: CPT | Performed by: PHYSICIAN ASSISTANT

## 2024-10-31 PROCEDURE — 83540 ASSAY OF IRON: CPT | Performed by: PHYSICIAN ASSISTANT

## 2024-10-31 PROCEDURE — 82570 ASSAY OF URINE CREATININE: CPT | Performed by: PHYSICIAN ASSISTANT

## 2024-10-31 RX ORDER — CETIRIZINE HYDROCHLORIDE 10 MG/1
10 TABLET ORAL DAILY
COMMUNITY

## 2024-10-31 RX ORDER — TIRZEPATIDE 5 MG/.5ML
5 INJECTION, SOLUTION SUBCUTANEOUS
Qty: 2 ML | Refills: 3 | Status: SHIPPED | OUTPATIENT
Start: 2024-10-31

## 2024-10-31 ASSESSMENT — PAIN SCALES - GENERAL: PAINLEVEL_OUTOF10: NO PAIN (0)

## 2024-10-31 NOTE — PATIENT INSTRUCTIONS
Decrease Mounjaro to 5 mg.  PAP smear  Flu/COVID today        Dermatology Specialists    [facility]  [address] [place] [address], [address]  [place] [address] [phone]       [facility]    [address] [facility] [address], [place] [address]  [phone]    [facility]:     [place] - [address]  [address] [facility] [address] [facility] [address] [place] [address]  Main Phone: [phone]    OR    [facility] - [address]  [address] [place] [id number] [phone]    [facility]:     [address] [facility] [address]  [place] [address] [phone]

## 2024-10-31 NOTE — PROGRESS NOTES
"Assessment & Plan     Type 2 diabetes mellitus without complication, without long-term current use of insulin (H)  Today she is due for her injection.  Encouraged her to take the next 2 to 3 days off of the medication to see if this helps increase her appetite when she is ready decrease Mounjaro to 5 mg.  Plan to check in virtually over the next 1 to 2 months to see how things are going.  At that time could consider decreasing Mounjaro to 2.5 mg weekly or 2.5 mg every 1.5 weeks.  Could also get creative with decreasing to Ozempic etc. routine labs today including A1c.  - MOUNJARO 5 MG/0.5ML SOAJ  Dispense: 2 mL; Refill: 3  - CBC with platelets and differential  - Comprehensive metabolic panel (BMP + Alb, Alk Phos, ALT, AST, Total. Bili, TP)  - Hemoglobin A1c  - Albumin Random Urine Quantitative with Creat Ratio  - FOOT EXAM    Anxiety  Continue Lexapro 10 mg and continue seeing therapist regularly.    Other fatigue  Labs today.  Suspect with above adjustments in medication that should improve.  - CBC with platelets and differential  - Comprehensive metabolic panel (BMP + Alb, Alk Phos, ALT, AST, Total. Bili, TP)  - Iron and iron binding capacity  - Ferritin  - Vitamin B12    Plantar warts  Referral placed to dermatology for further treatment.  Continue OTC treatments.  - Adult Dermatology  Referral      30 minutes spent by me on the date of the encounter on chart review, review of test results, interpretation of tests, patient visit, and documentation       The longitudinal plan of care for the diagnosis(es)/condition(s) as documented were addressed during this visit. Due to the added complexity in care, I will continue to support Usha in the subsequent management and with ongoing continuity of care.    BMI  Estimated body mass index is 27.8 kg/m  as calculated from the following:    Height as of this encounter: 1.685 m (5' 6.34\").    Weight as of this encounter: 78.9 kg (174 lb).         No follow-ups on " file.    The likelihood of other entities in the differential is insufficient to justify any further testing for them at this time. This was explained to the patient. The patient was advised that persistent or worsening symptoms would require further evaluation. Patient advised to call the office and if unable to reach to go to the emergency room if they develop any new or worsening symptoms. Expressed understanding and agreement with above stated plan.     MIRANDA [facility]   Usha Wong is a 24 year old female presenting for the following health issues:  Patient presents with:  Diabetes  Recheck Medication    Here today for routine follow-up.    Notes significant decrease in appetite with Mounjaro 7.5 mg.  Blood sugars stay in a very tight range between 80 and 100.  States friends as well as her self are concerned about the lack of nutrition that she is taking in.  Lack of hunger cues.  Working closely with her therapist who previously did work at the [facility] [facility].  This is a great resource for her.  Occasionally notes some nausea.  No abdominal pain.    -Due for Pap  -Due for flu/COVID shot which we will update today  -Significant improvement in mood with Lexapro 10 mg.  No panic attacks.  -Wishes to see dermatology for plantar warts.  -Notes generalized sense of fatigue due to above nutritional issues.  Requesting labs today.  Thyroid level completed back in February was normal.    Wt Readings from Last 10 Encounters:   10/31/24 78.9 kg (174 lb)   04/12/24 82.6 kg (182 lb)   03/15/24 85.7 kg (189 lb)   02/16/24 90.6 kg (199 lb 11.2 oz)   01/11/24 94.8 kg (209 lb)   10/11/23 101.2 kg (223 lb)   10/02/23 100.8 kg (222 lb 3.2 oz)   01/20/19 113.4 kg (250 lb) (>99%, Z= 2.46)*   11/15/18 116.6 kg (257 lb) (>99%, Z= 2.51)*   06/13/18 114.7 kg (252 lb 12.8 oz) (>99%, Z= 2.50)*     * Growth percentiles are based on CDC (Girls, 2-20 Years) data.  "        Review of Systems   Constitutional, HEENT, cardiovascular, pulmonary, GI, , musculoskeletal, neuro, skin, endocrine and psych systems are negative, except as otherwise noted.      Objective    /80 (BP Location: Left arm, Patient Position: Sitting, Cuff Size: Adult Regular)   Pulse 93   Temp 98.1  F (36.7  C)   Resp 16   Ht 1.685 m (5' 6.34\")   Wt 78.9 kg (174 lb)   LMP 09/13/2024 (Exact Date)   SpO2 99%   BMI 27.80 kg/m    5' 6.339\"  174 lbs 0 oz    EXAM:  GENERAL APPEARANCE: healthy, alert and no distress  EYES: Eyes grossly normal to inspection, PERRL and conjunctivae and sclerae normal  NECK: no asymmetry, masses, or scars  RESP: lungs clear to auscultation - no rales, rhonchi or wheezes  CV: regular rates and rhythm, normal S1 S2, no S3 or S4 and no murmur, click or rub  MS: extremities normal- no gross deformities noted  SKIN: no suspicious lesions or rashes  NEURO: Normal strength and tone, mentation intact and speech normal  PSYCH: mentation appears normal and affect normal        Answers submitted by the patient for this visit:  Patient Health Questionnaire (G7) (Submitted on 10/29/2024)  MICHELLE 7 TOTAL SCORE: 9  Diabetes Visit (Submitted on 10/29/2024)  Chief Complaint: Chronic problems general questions HPI Form  Frequency of checking blood sugars:: continous glucose monitor  What time of day are you checking your blood sugars : before and after meals, at bedtime  Have you had any blood sugars above 200?: No  Have you had any blood sugars below 70?: Yes  Hypoglycemia symptoms:: shakiness, weakness, other  Diabetic concerns:: low blood sugar, several less than 70 in the past few weeks, other  Paraesthesia present:: weight loss  Depression / Anxiety Questionnaire (Submitted on 10/29/2024)  Chief Complaint: Chronic problems general questions HPI Form  Depression/Anxiety: Depression & Anxiety  Depression & Anxiety (Submitted on 10/29/2024)  Chief Complaint: Chronic problems general " questions HPI Form  Status since last visit:: better  Anxiety since last: : medium  Other associated symptoms of depression:: No  Other associated symotome: : Yes  Significant life event: : relationship concerns, health concerns  Anxious:: Yes  Current substance use:: No  General Questionnaire (Submitted on 10/29/2024)  Chief Complaint: Chronic problems general questions HPI Form  How many servings of fruits and vegetables do you eat daily?: 0-1  On average, how many sweetened beverages do you drink each day (Examples: soda, juice, sweet tea, etc.  Do NOT count diet or artificially sweetened beverages)?: 2  How many minutes a day do you exercise enough to make your heart beat faster?: 20 to 29  How many days a week do you exercise enough to make your heart beat faster?: 4  How many days per week do you miss taking your medication?: 0  Questionnaire about: Chronic problems general questions HPI Form (Submitted on 10/29/2024)  Chief Complaint: Chronic problems general questions HPI Form

## 2024-11-01 LAB
CREAT UR-MCNC: 283 MG/DL
MICROALBUMIN UR-MCNC: 13.2 MG/L
MICROALBUMIN/CREAT UR: 4.66 MG/G CR (ref 0–25)

## 2024-11-01 NOTE — RESULT ENCOUNTER NOTE
-Stable blood counts. No signs of anemia.     -Your comprehensive metabolic panel which includes tests of liver function (ALT, AST, total bilirubin, alkaline phosphatase), kidney function (creatinine, BUN), electrolytes (sodium, potassium, calcium), and blood sugar (glucose) returned stable for you.    -A1c down to 4.6% - let' back off the Mounjaro like we talked about.     -B12 is normal as is your iron. Iron more on the low end of normal.  I do not think that you need to supplement currently.    Let me know if you have any questions or concerns,     Sagar Spangler PA-C  [facility]

## 2024-12-03 ENCOUNTER — VIRTUAL VISIT (OUTPATIENT)
Dept: FAMILY MEDICINE | Facility: CLINIC | Age: 24
End: 2024-12-03
Payer: COMMERCIAL

## 2024-12-03 DIAGNOSIS — F41.9 ANXIETY: ICD-10-CM

## 2024-12-03 DIAGNOSIS — E11.9 TYPE 2 DIABETES MELLITUS WITHOUT COMPLICATION, WITHOUT LONG-TERM CURRENT USE OF INSULIN (H): Primary | ICD-10-CM

## 2024-12-03 PROCEDURE — G2211 COMPLEX E/M VISIT ADD ON: HCPCS | Mod: 95 | Performed by: PHYSICIAN ASSISTANT

## 2024-12-03 PROCEDURE — 99213 OFFICE O/P EST LOW 20 MIN: CPT | Mod: 95 | Performed by: PHYSICIAN ASSISTANT

## 2024-12-03 RX ORDER — ESCITALOPRAM OXALATE 10 MG/1
10 TABLET ORAL DAILY
Qty: 90 TABLET | Refills: 3 | Status: SHIPPED | OUTPATIENT
Start: 2024-12-03

## 2024-12-03 ASSESSMENT — PATIENT HEALTH QUESTIONNAIRE - PHQ9: SUM OF ALL RESPONSES TO PHQ QUESTIONS 1-9: 10

## 2024-12-03 NOTE — PROGRESS NOTES
"Usha is a 24 year old who is being evaluated via a billable video visit.    How would you like to obtain your AVS? MyChart  If the video visit is dropped, the invitation should be resent by: Text to cell phone: [phone]  Will anyone else be joining your video visit? No      Assessment & Plan     Type 2 diabetes mellitus without complication, without long-term current use of insulin (H)  Recently completed Mounjaro 5 mg injection.  Decreased at last visit from 7.5 mg.  Given decreased appetite and disordered thinking towards food recommend to discontinue the medication entirely.  This will likely be out of her system by the time that she starts the [facility] IOP.  She will monitor her blood sugars at home.  Could easily consider a oral medication such as metformin/Jardiance in the future if blood sugars spike.  Plan to follow-up in the spring, sooner as needed.  Would be hesitant to this point to resume GLP-1 therapy but could consider in the future.    Anxiety  Needs prescription sent to [facility] pharmacy.  - escitalopram (LEXAPRO) 10 MG tablet  Dispense: 90 tablet; Refill: 3      20 minutes spent by me on the date of the encounter doing chart review, review of test results, interpretation of tests, patient visit, and documentation     The longitudinal plan of care for the diagnosis(es)/condition(s) as documented were addressed during this visit. Due to the added complexity in care, I will continue to support Usha in the subsequent management and with ongoing continuity of care.      BMI  Estimated body mass index is 27.8 kg/m  as calculated from the following:    Height as of 10/31/24: 1.685 m (5' 6.34\").    Weight as of 10/31/24: 78.9 kg (174 lb).   Weight management plan: Discussed healthy diet and exercise guidelines    Depression Screening Follow Up        12/3/2024    11:07 AM   PHQ   PHQ-9 Total Score 10   Q9: Thoughts of better off dead/self-harm past 2 weeks Not at all         12/3/2024    11:07 " AM   Last PHQ-9   1.  Little interest or pleasure in doing things 1   2.  Feeling down, depressed, or hopeless 3   3.  Trouble falling or staying asleep, or sleeping too much 1   4.  Feeling tired or having little energy 1   5.  Poor appetite or overeating 3   6.  Feeling bad about yourself 1   7.  Trouble concentrating 0   8.  Moving slowly or restless 0   Q9: Thoughts of better off dead/self-harm past 2 weeks 0   PHQ-9 Total Score 10   Difficulty at work, home, or with people Somewhat difficult         Follow Up Actions Taken  Crisis resource information provided in After Visit Summary  Referred patient back to current mental health provider.         Wilfredo Kerr is a 24 year old, presenting for the following health issues:  Recheck Medication (Discuss doses)    Here for medication follow-up: Down to 5 mg of Mounjaro.  No change in appetite.  Notes that approximately 2 to 3 days out of the week she does not eat anything.  Planning to start the [facility] IOP in December. Notes some disordered feelings towards food. Working closely with her therapist.  Blood sugars continue to be in a excellent range, .       History of Present Illness       Diabetes:   She presents for follow up of diabetes.    She is not checking blood glucose.         She has no concerns regarding her diabetes at this time.  She is having weight loss.            She eats 0-1 servings of fruits and vegetables daily.She consumes 2 sweetened beverage(s) daily.She exercises with enough effort to increase her heart rate 30 to 60 minutes per day.  She exercises with enough effort to increase her heart rate 3 or less days per week.   She is taking medications regularly.           Review of Systems  Constitutional, neuro, ENT, endocrine, pulmonary, cardiac, gastrointestinal, genitourinary, musculoskeletal, integument and psychiatric systems are negative, except as otherwise noted.      Objective    Vitals - Patient Reported  Weight  "(Patient Reported): 78 kg (172 lb)  Height (Patient Reported): 168.5 cm (5' 6.34\")  BMI (Based on Pt Reported Ht/Wt): 27.48  Pain Score: No Pain (0)        Physical Exam   GENERAL: alert and no distress  EYES: Eyes grossly normal to inspection.  No discharge or erythema, or obvious scleral/conjunctival abnormalities.  RESP: No audible wheeze, cough, or visible cyanosis.    SKIN: Visible skin clear. No significant rash, abnormal pigmentation or lesions.  NEURO: Cranial nerves grossly intact.  Mentation and speech appropriate for age.  PSYCH: Appropriate affect, tone, and pace of words      Video-Visit Details    Type of service:  Video Visit   Originating Location (pt. Location): Home    Distant Location (provider location):  On-site  Platform used for Video Visit: Maynor    The likelihood of other entities in the differential is insufficient to justify any further testing for them at this time. This was explained to the patient. The patient was advised that persistent or worsening symptoms would require further evaluation. Patient advised to call the office and if unable to reach to go to the emergency room if they develop any new or worsening symptoms. Expressed understanding and agreement with above stated plan.     Signed Electronically by: Sagar Spangler PA-C    "

## 2024-12-24 ENCOUNTER — OFFICE VISIT (OUTPATIENT)
Dept: FAMILY MEDICINE | Facility: CLINIC | Age: 24
End: 2024-12-24
Payer: COMMERCIAL

## 2024-12-24 VITALS
HEART RATE: 98 BPM | RESPIRATION RATE: 20 BRPM | OXYGEN SATURATION: 98 % | HEIGHT: 66 IN | DIASTOLIC BLOOD PRESSURE: 81 MMHG | SYSTOLIC BLOOD PRESSURE: 122 MMHG | WEIGHT: 176.8 LBS | BODY MASS INDEX: 28.42 KG/M2 | TEMPERATURE: 98.2 F

## 2024-12-24 DIAGNOSIS — R01.1 SYSTOLIC MURMUR: ICD-10-CM

## 2024-12-24 DIAGNOSIS — E11.9 DIABETES MELLITUS, NEW ONSET (H): Primary | ICD-10-CM

## 2024-12-24 DIAGNOSIS — R53.83 OTHER FATIGUE: ICD-10-CM

## 2024-12-24 PROCEDURE — G2211 COMPLEX E/M VISIT ADD ON: HCPCS | Performed by: PHYSICIAN ASSISTANT

## 2024-12-24 PROCEDURE — 99213 OFFICE O/P EST LOW 20 MIN: CPT | Performed by: PHYSICIAN ASSISTANT

## 2024-12-24 ASSESSMENT — PAIN SCALES - GENERAL: PAINLEVEL_OUTOF10: NO PAIN (0)

## 2024-12-24 NOTE — PROGRESS NOTES
"  Assessment & Plan     Other fatigue  Systolic murmur  Holosystolic murmur auscultated.  Reviewed murmurs with her.  Change in body composition over the past 1 to 2 years.  Echo ordered for further evaluation.  - Echocardiogram Complete    Diabetes mellitus, new onset (H)  Stable.  Continue [employer] program follow-up.    20 minutes spent by me on the date of the encounter doing chart review, review of test results, interpretation of tests, patient visit, and documentation     The longitudinal plan of care for the diagnosis(es)/condition(s) as documented were addressed during this visit. Due to the added complexity in care, I will continue to support Usha in the subsequent management and with ongoing continuity of care.    Subjective   Usha is a 24 year old, presenting for the following health issues:  Heart Problem (Patient was seen by a provider thru the [facility].  They suggest seeing primary care provider for possible heart murmur.  )      12/24/2024     8:03 AM   Additional Questions   Roomed by Giorgio ANNA MA   Accompanied by Self     Heart murmur auscultated by provider at [facility].  Denies shortness of breath, chest pain, heart racing, LOC. Noted a mild shortness of breath over the past few months as her food intake was very restricted.  Since improving her relationship with food and she has been eating stopping the Mounjaro she has been eating more and the symptoms have improved.      Heart Problem    History of Present Illness       Reason for visit:  Heart murmur   She is taking medications regularly.       Review of Systems  Constitutional, neuro, ENT, endocrine, pulmonary, cardiac, gastrointestinal, genitourinary, musculoskeletal, integument and psychiatric systems are negative, except as otherwise noted.      Objective    /81 (BP Location: Right arm, Patient Position: Sitting, Cuff Size: Adult Regular)   Pulse 98   Temp 98.2  F (36.8  C) (Oral)   Resp 20   Ht 1.683 m (5' 6.25\")   Wt " 80.2 kg (176 lb 12.8 oz)   LMP 12/09/2024 (Exact Date)   SpO2 98%   BMI 28.32 kg/m    Body mass index is 28.32 kg/m .  Physical Exam   GENERAL: alert and no distress  EYES: Eyes grossly normal to inspection, PERRL and conjunctivae and sclerae normal  NECK: no adenopathy, no asymmetry, masses, or scars  RESP: lungs clear to auscultation - no rales, rhonchi or wheezes  CV: Holosystolic murmur heard best at the left upper sternal border.  Regular rate and rhythm, no click or rub, no peripheral edema  MS: no gross musculoskeletal defects noted, no edema  SKIN: no suspicious lesions or rashes  NEURO: Normal strength and tone, mentation intact and speech normal  PSYCH: mentation appears normal, affect normal/bright    The likelihood of other entities in the differential is insufficient to justify any further testing for them at this time. This was explained to the patient. The patient was advised that persistent or worsening symptoms would require further evaluation. Patient advised to call the office and if unable to reach to go to the emergency room if they develop any new or worsening symptoms. Expressed understanding and agreement with above stated plan.         Signed Electronically by: Sagar Spangler PA-C

## 2025-04-02 ENCOUNTER — MYC MEDICAL ADVICE (OUTPATIENT)
Dept: FAMILY MEDICINE | Facility: CLINIC | Age: 25
End: 2025-04-02
Payer: COMMERCIAL

## 2025-04-02 DIAGNOSIS — E66.812 CLASS 2 OBESITY WITH BODY MASS INDEX (BMI) OF 35.0 TO 35.9 IN ADULT, UNSPECIFIED OBESITY TYPE, UNSPECIFIED WHETHER SERIOUS COMORBIDITY PRESENT: ICD-10-CM

## 2025-04-02 DIAGNOSIS — E11.9 TYPE 2 DIABETES MELLITUS WITHOUT COMPLICATION, WITHOUT LONG-TERM CURRENT USE OF INSULIN (H): Primary | ICD-10-CM

## 2025-04-19 ENCOUNTER — HEALTH MAINTENANCE LETTER (OUTPATIENT)
Age: 25
End: 2025-04-19

## 2025-07-05 ENCOUNTER — MYC REFILL (OUTPATIENT)
Dept: FAMILY MEDICINE | Facility: CLINIC | Age: 25
End: 2025-07-05
Payer: COMMERCIAL

## 2025-07-05 DIAGNOSIS — E11.9 TYPE 2 DIABETES MELLITUS WITHOUT COMPLICATION, WITHOUT LONG-TERM CURRENT USE OF INSULIN (H): ICD-10-CM

## 2025-07-05 DIAGNOSIS — F41.9 ANXIETY: ICD-10-CM

## 2025-07-05 DIAGNOSIS — E66.812 CLASS 2 OBESITY WITH BODY MASS INDEX (BMI) OF 35.0 TO 35.9 IN ADULT, UNSPECIFIED OBESITY TYPE, UNSPECIFIED WHETHER SERIOUS COMORBIDITY PRESENT: ICD-10-CM

## 2025-07-07 RX ORDER — ESCITALOPRAM OXALATE 5 MG/1
5 TABLET ORAL DAILY
Qty: 90 TABLET | Refills: 3 | Status: SHIPPED | OUTPATIENT
Start: 2025-07-07

## 2025-07-07 RX ORDER — TIRZEPATIDE 5 MG/.5ML
5 INJECTION, SOLUTION SUBCUTANEOUS
Qty: 2 ML | Refills: 3 | Status: SHIPPED | OUTPATIENT
Start: 2025-07-07

## 2025-07-07 RX ORDER — ESCITALOPRAM OXALATE 10 MG/1
10 TABLET ORAL DAILY
Qty: 90 TABLET | Refills: 3 | Status: SHIPPED | OUTPATIENT
Start: 2025-07-07

## 2025-07-07 NOTE — TELEPHONE ENCOUNTER
Patient Contact    Attempt # 1    Was call answered?  No.  Left message on voicemail with information to call [place] [facility] triage RN back or check [facility] message.    Upon callback please verify current doses of Mounjaro and Lexapro.     Jeanie Enriquez RN on 7/7/2025 at 1:36 PM

## 2025-07-07 NOTE — TELEPHONE ENCOUNTER
To PCP:    Patient wrote in confirming her dosage of 15 mg daily Lexapro.    Patient would like to move up to Monjaro 5 mg, pended for review.    Routing high priority as Patient reports she is out of both medications.    Thank you.    Raul Huertas RN  [facility]

## 2025-07-10 ASSESSMENT — ANXIETY QUESTIONNAIRES
4. TROUBLE RELAXING: NEARLY EVERY DAY
7. FEELING AFRAID AS IF SOMETHING AWFUL MIGHT HAPPEN: NEARLY EVERY DAY
6. BECOMING EASILY ANNOYED OR IRRITABLE: NEARLY EVERY DAY
5. BEING SO RESTLESS THAT IT IS HARD TO SIT STILL: NEARLY EVERY DAY
GAD7 TOTAL SCORE: 21
8. IF YOU CHECKED OFF ANY PROBLEMS, HOW DIFFICULT HAVE THESE MADE IT FOR YOU TO DO YOUR WORK, TAKE CARE OF THINGS AT HOME, OR GET ALONG WITH OTHER PEOPLE?: EXTREMELY DIFFICULT
7. FEELING AFRAID AS IF SOMETHING AWFUL MIGHT HAPPEN: NEARLY EVERY DAY
1. FEELING NERVOUS, ANXIOUS, OR ON EDGE: NEARLY EVERY DAY
GAD7 TOTAL SCORE: 21
2. NOT BEING ABLE TO STOP OR CONTROL WORRYING: NEARLY EVERY DAY
3. WORRYING TOO MUCH ABOUT DIFFERENT THINGS: NEARLY EVERY DAY
GAD7 TOTAL SCORE: 21
IF YOU CHECKED OFF ANY PROBLEMS ON THIS QUESTIONNAIRE, HOW DIFFICULT HAVE THESE PROBLEMS MADE IT FOR YOU TO DO YOUR WORK, TAKE CARE OF THINGS AT HOME, OR GET ALONG WITH OTHER PEOPLE: EXTREMELY DIFFICULT

## 2025-07-16 ENCOUNTER — VIRTUAL VISIT (OUTPATIENT)
Dept: FAMILY MEDICINE | Facility: CLINIC | Age: 25
End: 2025-07-16
Payer: COMMERCIAL

## 2025-07-16 DIAGNOSIS — F90.9 ATTENTION DEFICIT HYPERACTIVITY DISORDER (ADHD), UNSPECIFIED ADHD TYPE: ICD-10-CM

## 2025-07-16 DIAGNOSIS — F41.9 ANXIETY: ICD-10-CM

## 2025-07-16 DIAGNOSIS — E11.9 TYPE 2 DIABETES MELLITUS WITHOUT COMPLICATION, WITHOUT LONG-TERM CURRENT USE OF INSULIN (H): Primary | ICD-10-CM

## 2025-07-16 PROCEDURE — 98006 SYNCH AUDIO-VIDEO EST MOD 30: CPT | Performed by: PHYSICIAN ASSISTANT

## 2025-07-16 NOTE — PROGRESS NOTES
"Usha is a 24 year old who is being evaluated via a billable video visit.    How would you like to obtain your AVS? MyChart  If the video visit is dropped, the invitation should be resent by: Text to cell phone: [phone]  Will anyone else be joining your video visit? No      Assessment & Plan     Type 2 diabetes mellitus without complication, without long-term current use of insulin (H)  Mounjaro 5 mg weekly.  Check A1c next week.  Titrate Mounjaro as needed.  Healthy diet and exercise.  Continue to monitor blood sugar once daily.  - Hemoglobin A1c    Attention deficit hyperactivity disorder (ADHD), unspecified ADHD type  Reviewed ADHD.  Reviewed nonstimulant as well as stimulant medications.  We decided to pursue Vyvanse 30 mg daily.  Safety/side effects/expectations reviewed.  6-month follow-up.  Will sign controlled substance agreement next time she is in the office.  Thank you posted over the next coming weeks with how she is tolerating it and if the dose needs to be adjusted.  She will take in the morning and with food to start.  - lisdexamfetamine (VYVANSE) 30 MG capsule  Dispense: 30 capsule; Refill: 0    Anxiety  Continue Lexapro    BMI  Estimated body mass index is 35.14 kg/m  as calculated from the following:    Height as of 3/28/25: 1.676 m (5' 6\").    Weight as of 3/28/25: 98.7 kg (217 lb 11.2 oz).   Weight management plan: Discussed healthy diet and exercise guidelines    The longitudinal plan of care for the diagnosis(es)/condition(s) as documented were addressed during this visit. Due to the added complexity in care, I will continue to support Usha in the subsequent management and with ongoing continuity of care.    Subjective   Usha is a 24 year old, presenting for the following health issues:  Diabetes    Here today via video visit to discuss diabetes management.  Notes redevelopment of darkening skin in armpits, breast, back or neck.  This was present at her diagnosis of diabetes.  Start " checking her fasting blood sugars again and they are in the 130-150 range.  Currently taking Mounjaro 2.5 mg every other week.  We just increased to 5 mg.  She is tolerating this well.    Continues to work with a therapist that she had met during the [facility] program.  Underwent a screening ADHD test which she sent in via [employer].  Diagnosed with ADHD.  Wishes to discuss medication.  She will be returning to school in August.      History of Present Illness       Mental Health Follow-up:  Patient presents to follow-up on Depression & Anxiety.Patient's depression since last visit has been:  Medium  The patient is having other symptoms associated with depression.  Patient's anxiety since last visit has been:  Worse  The patient is having other symptoms associated with anxiety.  Any significant life events: relationship concerns, grief or loss and health concerns  Patient is feeling anxious or having panic attacks.  Patient has no concerns about alcohol or drug use.    Diabetes:   She presents for follow up of diabetes.  She is checking home blood glucose three times daily.   She checks blood glucose before and after meals.  Blood glucose is sometimes over 200 and sometimes under 70. She is aware of hypoglycemia symptoms including shakiness, weakness, blurred vision and confusion.   She is concerned about other.   She is having excessive thirst, blurry vision and weight gain.            Reason for visit:  Medication follow up and want to check a1c    She eats 2-3 servings of fruits and vegetables daily.She consumes 3 sweetened beverage(s) daily.She exercises with enough effort to increase her heart rate 30 to 60 minutes per day.  She exercises with enough effort to increase her heart rate 4 days per week.   She is taking medications regularly.   Usha Wong, age 24, female  - Mention of diabetes      Review of Systems  Constitutional, neuro, ENT, endocrine, pulmonary, cardiac, gastrointestinal, genitourinary,  musculoskeletal, integument and psychiatric systems are negative, except as otherwise noted.      Objective           Vitals:  No vitals were obtained today due to virtual visit.    Physical Exam   GENERAL: alert and no distress  EYES: Eyes grossly normal to inspection.  No discharge or erythema, or obvious scleral/conjunctival abnormalities.  RESP: No audible wheeze, cough, or visible cyanosis.    SKIN: Visible skin clear. No significant rash, abnormal pigmentation or lesions.  NEURO: Cranial nerves grossly intact.  Mentation and speech appropriate for age.  PSYCH: Appropriate affect, tone, and pace of words    Video-Visit Details    Type of service:  Video Visit   Originating Location (pt. Location): Home    Distant Location (provider location):  On-site  Platform used for Video Visit: Maynor    The likelihood of other entities in the differential is insufficient to justify any further testing for them at this time. This was explained to the patient. The patient was advised that persistent or worsening symptoms would require further evaluation. Patient advised to call the office and if unable to reach to go to the emergency room if they develop any new or worsening symptoms. Expressed understanding and agreement with above stated plan.    Signed Electronically by: Sagar Spangler PA-C

## 2025-07-17 PROBLEM — E11.9 TYPE 2 DIABETES MELLITUS WITHOUT COMPLICATION, WITHOUT LONG-TERM CURRENT USE OF INSULIN (H): Status: ACTIVE | Noted: 2023-09-30

## 2025-07-17 PROBLEM — F90.9 ATTENTION DEFICIT HYPERACTIVITY DISORDER (ADHD), UNSPECIFIED ADHD TYPE: Status: ACTIVE | Noted: 2025-07-17

## 2025-07-17 RX ORDER — LISDEXAMFETAMINE DIMESYLATE 30 MG/1
30 CAPSULE ORAL EVERY MORNING
Qty: 30 CAPSULE | Refills: 0 | Status: SHIPPED | OUTPATIENT
Start: 2025-07-17